# Patient Record
Sex: MALE | Race: WHITE | NOT HISPANIC OR LATINO | URBAN - METROPOLITAN AREA
[De-identification: names, ages, dates, MRNs, and addresses within clinical notes are randomized per-mention and may not be internally consistent; named-entity substitution may affect disease eponyms.]

---

## 2017-04-27 ENCOUNTER — OFFICE VISIT - RIVER FALLS (OUTPATIENT)
Dept: FAMILY MEDICINE | Facility: CLINIC | Age: 57
End: 2017-04-27

## 2017-04-27 ENCOUNTER — COMMUNICATION - RIVER FALLS (OUTPATIENT)
Dept: FAMILY MEDICINE | Facility: CLINIC | Age: 57
End: 2017-04-27

## 2017-04-28 LAB
CHOLEST SERPL-MCNC: 162 MG/DL (ref 125–200)
CHOLEST/HDLC SERPL: 4 {RATIO}
CREAT SERPL-MCNC: 0.77 MG/DL (ref 0.7–1.33)
GLUCOSE BLD-MCNC: 107 MG/DL (ref 65–99)
HBA1C MFR BLD: 6.2 %
HDLC SERPL-MCNC: 41 MG/DL
LDLC SERPL CALC-MCNC: 105 MG/DL
NONHDLC SERPL-MCNC: 121 MG/DL
TRIGL SERPL-MCNC: 80 MG/DL

## 2017-05-01 ENCOUNTER — OFFICE VISIT - RIVER FALLS (OUTPATIENT)
Dept: FAMILY MEDICINE | Facility: CLINIC | Age: 57
End: 2017-05-01

## 2017-05-01 ASSESSMENT — MIFFLIN-ST. JEOR: SCORE: 1901.24

## 2018-02-19 ENCOUNTER — AMBULATORY - RIVER FALLS (OUTPATIENT)
Dept: FAMILY MEDICINE | Facility: CLINIC | Age: 58
End: 2018-02-19

## 2018-02-20 LAB
CREAT SERPL-MCNC: 0.72 MG/DL (ref 0.7–1.33)
GLUCOSE BLD-MCNC: 114 MG/DL (ref 65–99)
HBA1C MFR BLD: 6 %
PSA SERPL-MCNC: 0.5 NG/ML

## 2018-02-28 ENCOUNTER — OFFICE VISIT - RIVER FALLS (OUTPATIENT)
Dept: FAMILY MEDICINE | Facility: CLINIC | Age: 58
End: 2018-02-28

## 2018-02-28 ASSESSMENT — MIFFLIN-ST. JEOR: SCORE: 1899.42

## 2018-08-23 ENCOUNTER — AMBULATORY - RIVER FALLS (OUTPATIENT)
Dept: FAMILY MEDICINE | Facility: CLINIC | Age: 58
End: 2018-08-23

## 2018-08-24 LAB
CHOLEST SERPL-MCNC: 173 MG/DL
CHOLEST/HDLC SERPL: 3.9 {RATIO}
CREAT SERPL-MCNC: 0.82 MG/DL (ref 0.7–1.33)
GLUCOSE BLD-MCNC: 101 MG/DL (ref 65–99)
HBA1C MFR BLD: 6.3 %
HDLC SERPL-MCNC: 44 MG/DL
LDLC SERPL CALC-MCNC: 110 MG/DL
NONHDLC SERPL-MCNC: 129 MG/DL
TRIGL SERPL-MCNC: 99 MG/DL

## 2018-08-29 ENCOUNTER — OFFICE VISIT - RIVER FALLS (OUTPATIENT)
Dept: FAMILY MEDICINE | Facility: CLINIC | Age: 58
End: 2018-08-29

## 2018-08-29 ASSESSMENT — MIFFLIN-ST. JEOR: SCORE: 1897.61

## 2019-03-04 ENCOUNTER — AMBULATORY - RIVER FALLS (OUTPATIENT)
Dept: FAMILY MEDICINE | Facility: CLINIC | Age: 59
End: 2019-03-04

## 2019-03-05 LAB
A/G RATIO - HISTORICAL: 1.6 (ref 1–2.5)
ALBUMIN SERPL-MCNC: 4.4 GM/DL (ref 3.6–5.1)
ALP SERPL-CCNC: 76 UNIT/L (ref 40–115)
ALT SERPL W P-5'-P-CCNC: 38 UNIT/L (ref 9–46)
AST SERPL W P-5'-P-CCNC: 40 UNIT/L (ref 10–35)
BILIRUB SERPL-MCNC: 0.8 MG/DL (ref 0.2–1.2)
BUN SERPL-MCNC: 16 MG/DL (ref 7–25)
BUN/CREAT RATIO - HISTORICAL: ABNORMAL (ref 6–22)
CALCIUM SERPL-MCNC: 9.4 MG/DL (ref 8.6–10.3)
CHLORIDE BLD-SCNC: 103 MMOL/L (ref 98–110)
CHOLEST SERPL-MCNC: 179 MG/DL
CHOLEST/HDLC SERPL: 3.9 {RATIO}
CO2 SERPL-SCNC: 26 MMOL/L (ref 20–32)
CREAT SERPL-MCNC: 0.77 MG/DL (ref 0.7–1.33)
CREAT UR-MCNC: 20 MG/DL (ref 20–320)
EGFRCR SERPLBLD CKD-EPI 2021: 99 ML/MIN/1.73M2
ERYTHROCYTE [DISTWIDTH] IN BLOOD BY AUTOMATED COUNT: 12.8 % (ref 11–15)
GLOBULIN: 2.7 (ref 1.9–3.7)
GLUCOSE BLD-MCNC: 94 MG/DL (ref 65–99)
HBA1C MFR BLD: 6.4 %
HCT VFR BLD AUTO: 43.1 % (ref 38.5–50)
HDLC SERPL-MCNC: 46 MG/DL
HGB BLD-MCNC: 14.4 GM/DL (ref 13.2–17.1)
LDLC SERPL CALC-MCNC: 116 MG/DL
MCH RBC QN AUTO: 29.6 PG (ref 27–33)
MCHC RBC AUTO-ENTMCNC: 33.4 GM/DL (ref 32–36)
MCV RBC AUTO: 88.5 FL (ref 80–100)
MICROALBUMIN UR-MCNC: <0.2 MG/DL
MICROALBUMIN/CREAT UR: NORMAL MG/G{CREAT}
NONHDLC SERPL-MCNC: 133 MG/DL
PLATELET # BLD AUTO: 221 10*3/UL (ref 140–400)
PMV BLD: 9.6 FL (ref 7.5–12.5)
POTASSIUM BLD-SCNC: 3.9 MMOL/L (ref 3.5–5.3)
PROT SERPL-MCNC: 7.1 GM/DL (ref 6.1–8.1)
PSA SERPL-MCNC: 0.6 NG/ML
RBC # BLD AUTO: 4.87 10*6/UL (ref 4.2–5.8)
SODIUM SERPL-SCNC: 138 MMOL/L (ref 135–146)
TRIGL SERPL-MCNC: 74 MG/DL
WBC # BLD AUTO: 7 10*3/UL (ref 3.8–10.8)

## 2019-03-11 ENCOUNTER — OFFICE VISIT - RIVER FALLS (OUTPATIENT)
Dept: FAMILY MEDICINE | Facility: CLINIC | Age: 59
End: 2019-03-11

## 2019-03-11 ASSESSMENT — MIFFLIN-ST. JEOR: SCORE: 1860.41

## 2019-09-05 ENCOUNTER — AMBULATORY - RIVER FALLS (OUTPATIENT)
Dept: FAMILY MEDICINE | Facility: CLINIC | Age: 59
End: 2019-09-05

## 2019-09-06 LAB
A/G RATIO - HISTORICAL: 1.4 (ref 1–2.5)
ALBUMIN SERPL-MCNC: 4.3 GM/DL (ref 3.6–5.1)
ALP SERPL-CCNC: 76 UNIT/L (ref 40–115)
ALT SERPL W P-5'-P-CCNC: 44 UNIT/L (ref 9–46)
AST SERPL W P-5'-P-CCNC: 55 UNIT/L (ref 10–35)
BILIRUB SERPL-MCNC: 1.1 MG/DL (ref 0.2–1.2)
BUN SERPL-MCNC: 19 MG/DL (ref 7–25)
BUN/CREAT RATIO - HISTORICAL: ABNORMAL (ref 6–22)
CALCIUM SERPL-MCNC: 9.3 MG/DL (ref 8.6–10.3)
CHLORIDE BLD-SCNC: 102 MMOL/L (ref 98–110)
CHOLEST SERPL-MCNC: 175 MG/DL
CHOLEST/HDLC SERPL: 3.9 {RATIO}
CO2 SERPL-SCNC: 27 MMOL/L (ref 20–32)
CREAT SERPL-MCNC: 0.95 MG/DL (ref 0.7–1.25)
EGFRCR SERPLBLD CKD-EPI 2021: 87 ML/MIN/1.73M2
ERYTHROCYTE [DISTWIDTH] IN BLOOD BY AUTOMATED COUNT: 12.2 % (ref 11–15)
GLOBULIN: 3.1 (ref 1.9–3.7)
GLUCOSE BLD-MCNC: 108 MG/DL (ref 65–99)
HBA1C MFR BLD: 6.3 %
HCT VFR BLD AUTO: 46 % (ref 38.5–50)
HDLC SERPL-MCNC: 45 MG/DL
HGB BLD-MCNC: 15.7 GM/DL (ref 13.2–17.1)
LDLC SERPL CALC-MCNC: 107 MG/DL
MCH RBC QN AUTO: 30.9 PG (ref 27–33)
MCHC RBC AUTO-ENTMCNC: 34.1 GM/DL (ref 32–36)
MCV RBC AUTO: 90.6 FL (ref 80–100)
NONHDLC SERPL-MCNC: 130 MG/DL
PLATELET # BLD AUTO: 207 10*3/UL (ref 140–400)
PMV BLD: 9.3 FL (ref 7.5–12.5)
POTASSIUM BLD-SCNC: 4 MMOL/L (ref 3.5–5.3)
PROT SERPL-MCNC: 7.4 GM/DL (ref 6.1–8.1)
RBC # BLD AUTO: 5.08 10*6/UL (ref 4.2–5.8)
SODIUM SERPL-SCNC: 138 MMOL/L (ref 135–146)
TRIGL SERPL-MCNC: 115 MG/DL
WBC # BLD AUTO: 9.1 10*3/UL (ref 3.8–10.8)

## 2019-09-09 ENCOUNTER — COMMUNICATION - RIVER FALLS (OUTPATIENT)
Dept: FAMILY MEDICINE | Facility: CLINIC | Age: 59
End: 2019-09-09

## 2019-09-12 ENCOUNTER — OFFICE VISIT - RIVER FALLS (OUTPATIENT)
Dept: FAMILY MEDICINE | Facility: CLINIC | Age: 59
End: 2019-09-12

## 2019-09-12 ASSESSMENT — MIFFLIN-ST. JEOR: SCORE: 1858.6

## 2020-03-09 ENCOUNTER — AMBULATORY - RIVER FALLS (OUTPATIENT)
Dept: FAMILY MEDICINE | Facility: CLINIC | Age: 60
End: 2020-03-09

## 2020-03-10 ENCOUNTER — COMMUNICATION - RIVER FALLS (OUTPATIENT)
Dept: FAMILY MEDICINE | Facility: CLINIC | Age: 60
End: 2020-03-10

## 2020-03-10 LAB
A/G RATIO - HISTORICAL: 1.4 (ref 1–2.5)
ALBUMIN SERPL-MCNC: 4.2 GM/DL (ref 3.6–5.1)
ALP SERPL-CCNC: 85 UNIT/L (ref 35–144)
ALT SERPL W P-5'-P-CCNC: 34 UNIT/L (ref 9–46)
AST SERPL W P-5'-P-CCNC: 29 UNIT/L (ref 10–35)
BILIRUB SERPL-MCNC: 0.7 MG/DL (ref 0.2–1.2)
BUN SERPL-MCNC: 16 MG/DL (ref 7–25)
BUN/CREAT RATIO - HISTORICAL: ABNORMAL (ref 6–22)
CALCIUM SERPL-MCNC: 9.5 MG/DL (ref 8.6–10.3)
CHLORIDE BLD-SCNC: 103 MMOL/L (ref 98–110)
CHOLEST SERPL-MCNC: 146 MG/DL
CHOLEST/HDLC SERPL: 3.7 {RATIO}
CO2 SERPL-SCNC: 27 MMOL/L (ref 20–32)
CREAT SERPL-MCNC: 0.72 MG/DL (ref 0.7–1.25)
EGFRCR SERPLBLD CKD-EPI 2021: 101 ML/MIN/1.73M2
ERYTHROCYTE [DISTWIDTH] IN BLOOD BY AUTOMATED COUNT: 12.8 % (ref 11–15)
GLOBULIN: 2.9 (ref 1.9–3.7)
GLUCOSE BLD-MCNC: 102 MG/DL (ref 65–99)
HBA1C MFR BLD: 6.2 %
HCT VFR BLD AUTO: 43.4 % (ref 38.5–50)
HDLC SERPL-MCNC: 39 MG/DL
HGB BLD-MCNC: 15.3 GM/DL (ref 13.2–17.1)
LDLC SERPL CALC-MCNC: 87 MG/DL
MCH RBC QN AUTO: 30.7 PG (ref 27–33)
MCHC RBC AUTO-ENTMCNC: 35.3 GM/DL (ref 32–36)
MCV RBC AUTO: 87.1 FL (ref 80–100)
MICROALBUMIN UR-MCNC: 0.2 MG/DL
NONHDLC SERPL-MCNC: 107 MG/DL
PLATELET # BLD AUTO: 214 10*3/UL (ref 140–400)
PMV BLD: 10.1 FL (ref 7.5–12.5)
POTASSIUM BLD-SCNC: 4.5 MMOL/L (ref 3.5–5.3)
PROT SERPL-MCNC: 7.1 GM/DL (ref 6.1–8.1)
PSA SERPL-MCNC: 0.9 NG/ML
RBC # BLD AUTO: 4.98 10*6/UL (ref 4.2–5.8)
SODIUM SERPL-SCNC: 138 MMOL/L (ref 135–146)
TRIGL SERPL-MCNC: 103 MG/DL
WBC # BLD AUTO: 8.7 10*3/UL (ref 3.8–10.8)

## 2020-09-09 ENCOUNTER — AMBULATORY - RIVER FALLS (OUTPATIENT)
Dept: FAMILY MEDICINE | Facility: CLINIC | Age: 60
End: 2020-09-09

## 2020-09-10 LAB
A/G RATIO - HISTORICAL: 1.4 (ref 1–2.5)
ALBUMIN SERPL-MCNC: 4.3 GM/DL (ref 3.6–5.1)
ALP SERPL-CCNC: 90 UNIT/L (ref 35–144)
ALT SERPL W P-5'-P-CCNC: 34 UNIT/L (ref 9–46)
AST SERPL W P-5'-P-CCNC: 36 UNIT/L (ref 10–35)
BILIRUB SERPL-MCNC: 0.9 MG/DL (ref 0.2–1.2)
BUN SERPL-MCNC: 16 MG/DL (ref 7–25)
BUN/CREAT RATIO - HISTORICAL: ABNORMAL (ref 6–22)
CALCIUM SERPL-MCNC: 9.5 MG/DL (ref 8.6–10.3)
CHLORIDE BLD-SCNC: 104 MMOL/L (ref 98–110)
CHOLEST SERPL-MCNC: 158 MG/DL
CHOLEST/HDLC SERPL: 3.4 {RATIO}
CO2 SERPL-SCNC: 28 MMOL/L (ref 20–32)
CREAT SERPL-MCNC: 0.9 MG/DL (ref 0.7–1.25)
EGFRCR SERPLBLD CKD-EPI 2021: 92 ML/MIN/1.73M2
ERYTHROCYTE [DISTWIDTH] IN BLOOD BY AUTOMATED COUNT: 12.8 % (ref 11–15)
GLOBULIN: 3.1 (ref 1.9–3.7)
GLUCOSE BLD-MCNC: 114 MG/DL (ref 65–99)
HBA1C MFR BLD: 6.4 %
HCT VFR BLD AUTO: 44.3 % (ref 38.5–50)
HDLC SERPL-MCNC: 46 MG/DL
HGB BLD-MCNC: 15.9 GM/DL (ref 13.2–17.1)
LDLC SERPL CALC-MCNC: 94 MG/DL
MCH RBC QN AUTO: 31.9 PG (ref 27–33)
MCHC RBC AUTO-ENTMCNC: 35.9 GM/DL (ref 32–36)
MCV RBC AUTO: 88.8 FL (ref 80–100)
NONHDLC SERPL-MCNC: 112 MG/DL
PLATELET # BLD AUTO: 209 10*3/UL (ref 140–400)
PMV BLD: 10.1 FL (ref 7.5–12.5)
POTASSIUM BLD-SCNC: 4.5 MMOL/L (ref 3.5–5.3)
PROT SERPL-MCNC: 7.4 GM/DL (ref 6.1–8.1)
RBC # BLD AUTO: 4.99 10*6/UL (ref 4.2–5.8)
SODIUM SERPL-SCNC: 139 MMOL/L (ref 135–146)
TRIGL SERPL-MCNC: 85 MG/DL
WBC # BLD AUTO: 8.4 10*3/UL (ref 3.8–10.8)

## 2020-09-14 ENCOUNTER — COMMUNICATION - RIVER FALLS (OUTPATIENT)
Dept: FAMILY MEDICINE | Facility: CLINIC | Age: 60
End: 2020-09-14

## 2020-09-15 ENCOUNTER — OFFICE VISIT - RIVER FALLS (OUTPATIENT)
Dept: FAMILY MEDICINE | Facility: CLINIC | Age: 60
End: 2020-09-15

## 2021-03-02 ENCOUNTER — OFFICE VISIT - RIVER FALLS (OUTPATIENT)
Dept: FAMILY MEDICINE | Facility: CLINIC | Age: 61
End: 2021-03-02

## 2021-03-03 ENCOUNTER — COMMUNICATION - RIVER FALLS (OUTPATIENT)
Dept: FAMILY MEDICINE | Facility: CLINIC | Age: 61
End: 2021-03-03

## 2021-03-03 LAB
A/G RATIO - HISTORICAL: 1.6 (ref 1–2.5)
ALBUMIN SERPL-MCNC: 4.4 GM/DL (ref 3.6–5.1)
ALP SERPL-CCNC: 95 UNIT/L (ref 35–144)
ALT SERPL W P-5'-P-CCNC: 30 UNIT/L (ref 9–46)
AST SERPL W P-5'-P-CCNC: 34 UNIT/L (ref 10–35)
BILIRUB SERPL-MCNC: 1.2 MG/DL (ref 0.2–1.2)
BUN SERPL-MCNC: 13 MG/DL (ref 7–25)
BUN/CREAT RATIO - HISTORICAL: NORMAL (ref 6–22)
CALCIUM SERPL-MCNC: 9.4 MG/DL (ref 8.6–10.3)
CHLORIDE BLD-SCNC: 103 MMOL/L (ref 98–110)
CO2 SERPL-SCNC: 27 MMOL/L (ref 20–32)
CREAT SERPL-MCNC: 0.82 MG/DL (ref 0.7–1.25)
CREAT UR-MCNC: 25 MG/DL (ref 20–320)
EGFRCR SERPLBLD CKD-EPI 2021: 95 ML/MIN/1.73M2
ERYTHROCYTE [DISTWIDTH] IN BLOOD BY AUTOMATED COUNT: 12.4 % (ref 11–15)
GLOBULIN: 2.7 (ref 1.9–3.7)
GLUCOSE BLD-MCNC: 95 MG/DL (ref 65–99)
HBA1C MFR BLD: 6.2 %
HCT VFR BLD AUTO: 44.4 % (ref 38.5–50)
HGB BLD-MCNC: 15.1 GM/DL (ref 13.2–17.1)
MCH RBC QN AUTO: 30.5 PG (ref 27–33)
MCHC RBC AUTO-ENTMCNC: 34 GM/DL (ref 32–36)
MCV RBC AUTO: 89.7 FL (ref 80–100)
MICROALBUMIN UR-MCNC: <0.2 MG/DL
MICROALBUMIN/CREAT UR: NORMAL MG/G{CREAT}
PLATELET # BLD AUTO: 204 10*3/UL (ref 140–400)
PMV BLD: 10.3 FL (ref 7.5–12.5)
POTASSIUM BLD-SCNC: 4.3 MMOL/L (ref 3.5–5.3)
PROT SERPL-MCNC: 7.1 GM/DL (ref 6.1–8.1)
PSA SERPL-MCNC: 0.6 NG/ML
RBC # BLD AUTO: 4.95 10*6/UL (ref 4.2–5.8)
SODIUM SERPL-SCNC: 138 MMOL/L (ref 135–146)
WBC # BLD AUTO: 8 10*3/UL (ref 3.8–10.8)

## 2021-03-08 ENCOUNTER — OFFICE VISIT - RIVER FALLS (OUTPATIENT)
Dept: FAMILY MEDICINE | Facility: CLINIC | Age: 61
End: 2021-03-08

## 2021-03-08 ASSESSMENT — MIFFLIN-ST. JEOR: SCORE: 1799.63

## 2021-08-02 ENCOUNTER — AMBULATORY - RIVER FALLS (OUTPATIENT)
Dept: FAMILY MEDICINE | Facility: CLINIC | Age: 61
End: 2021-08-02

## 2021-08-03 ENCOUNTER — COMMUNICATION - RIVER FALLS (OUTPATIENT)
Dept: FAMILY MEDICINE | Facility: CLINIC | Age: 61
End: 2021-08-03

## 2021-08-03 LAB — HBA1C MFR BLD: 6.2 %

## 2021-08-09 ENCOUNTER — OFFICE VISIT - RIVER FALLS (OUTPATIENT)
Dept: FAMILY MEDICINE | Facility: CLINIC | Age: 61
End: 2021-08-09

## 2021-08-09 ASSESSMENT — MIFFLIN-ST. JEOR: SCORE: 1780.12

## 2022-02-11 VITALS
WEIGHT: 247.6 LBS | DIASTOLIC BLOOD PRESSURE: 82 MMHG | BODY MASS INDEX: 37.53 KG/M2 | OXYGEN SATURATION: 98 % | HEIGHT: 68 IN | HEART RATE: 61 BPM | SYSTOLIC BLOOD PRESSURE: 124 MMHG

## 2022-02-11 VITALS
HEIGHT: 68 IN | DIASTOLIC BLOOD PRESSURE: 60 MMHG | WEIGHT: 226 LBS | SYSTOLIC BLOOD PRESSURE: 118 MMHG | HEART RATE: 62 BPM | BODY MASS INDEX: 34.25 KG/M2

## 2022-02-11 VITALS
TEMPERATURE: 98.1 F | HEIGHT: 68 IN | HEART RATE: 72 BPM | OXYGEN SATURATION: 98 % | SYSTOLIC BLOOD PRESSURE: 132 MMHG | DIASTOLIC BLOOD PRESSURE: 80 MMHG | WEIGHT: 221.7 LBS | BODY MASS INDEX: 33.6 KG/M2

## 2022-02-11 VITALS
HEIGHT: 68 IN | DIASTOLIC BLOOD PRESSURE: 70 MMHG | HEART RATE: 60 BPM | WEIGHT: 239.4 LBS | SYSTOLIC BLOOD PRESSURE: 122 MMHG | BODY MASS INDEX: 36.28 KG/M2 | OXYGEN SATURATION: 97 %

## 2022-02-11 VITALS
WEIGHT: 248.4 LBS | DIASTOLIC BLOOD PRESSURE: 82 MMHG | HEART RATE: 64 BPM | SYSTOLIC BLOOD PRESSURE: 130 MMHG | BODY MASS INDEX: 37.65 KG/M2 | HEIGHT: 68 IN

## 2022-02-11 VITALS
HEIGHT: 68 IN | SYSTOLIC BLOOD PRESSURE: 122 MMHG | WEIGHT: 248 LBS | HEART RATE: 60 BPM | BODY MASS INDEX: 37.59 KG/M2 | DIASTOLIC BLOOD PRESSURE: 68 MMHG | OXYGEN SATURATION: 97 %

## 2022-02-11 VITALS
HEART RATE: 60 BPM | OXYGEN SATURATION: 97 % | SYSTOLIC BLOOD PRESSURE: 128 MMHG | HEIGHT: 68 IN | WEIGHT: 239 LBS | DIASTOLIC BLOOD PRESSURE: 76 MMHG | BODY MASS INDEX: 36.22 KG/M2

## 2022-02-15 NOTE — PROGRESS NOTES
Patient:   NATE AMARAL            MRN: [id number]            FIN: [id number]               Age:   57 years     Sex:  Male     :  1960   Associated Diagnoses:   HTN (Hypertension); Diabetes mellitus type 2   Author:   Quinton France MD      Impression and Plan   Diagnosis     HTN (Hypertension) ([id number] I10).     Course:  Well controlled.    Orders     Orders   Charges (Evaluation and Management):  14020 office outpatient visit 15 minutes (Charge) (Order): Quantity: 1, HTN (Hypertension)  Hyperlipidemia  Diabetes mellitus type 2.     Orders (Selected)   Prescriptions  Prescribed  atenolol 50 mg oral tablet: 1 tab(s) ( 50 mg ), po, daily, # 90 tab(s), 1 Refill(s), Type: Maintenance, Pharmacy: [facility], Pt due for appt, 1 tab(s) po daily.     Diagnosis     Diabetes mellitus type 2 (AAJ41-PX E11.9).     Course:  Well controlled.    Orders     Orders (Selected)   Prescriptions  Prescribed  metFORMIN 500 mg oral tablet: 2 tab(s) ( 1,000 mg ), po, bid, # 360 tab(s), 1 Refill(s), Type: Maintenance, Pharmacy: [facility], Pt due for appt, 2 tab(s) po bid.        Visit Information      Date of Service: 2018 02:21 pm  Performing Location: [facility]  Encounter#: [id number]      Primary Care Provider (PCP):  Quinton France MD    NPI# [id number]      Referring Provider:  Quinton France MD, NPI# [id number]   Visit type:  Scheduled follow-up.    Accompanied by:  No one.    Source of history:  Self.    Referral source:  Self.    History limitation:  None.       Chief Complaint   2018 2:25 PM CST    Pt here for med ck        History of Present Illness             The patient presents for follow-up evaluation of diabetes.  The quality of the patient's diabetes is described as being unchanged from previous visit.  Relieving factors consist of diet changes, increased activity and medication.  Associated symptoms consist of none.  Medical encounters: none.  Compliance problems:  none.  Glucose results: within target range.  Hemoglobin A1c results: > 6% and within target range.  Lifestyle modification: weight reduction, diet, increased physical activity.  Medications: see medication list .  Additional pertinent history: last eye exam: 6/15/2017, last podiatric foot exam: 2/28/2018 and eye exam recommended.  No reported episodes of hypoglycemia.               The patient presents for follow-up evaluation of hypertension.  The quality of hypertension symptom(s) since the patient's last visit is described as being unchanged.  The severity of the hypertension symptom(s) since the last visit is moderate.  Since the patient's last visit, the timing/course of hypertension symptom(s) is constant.  Exacerbating factors consist of none.  Relieving factors consist of medication.  Associated symptoms consist of none.  Prior treatment consists of lifestyle modification (weight reduction, dietary sodium restriction, increased physical activity, adoption of DASH eating plan).  Medical encounters: none.  Compliance problems: none.        Review of Systems   Constitutional:  Negative.    Eye:  Negative.    Ear/Nose/Mouth/Throat:  Negative.    Respiratory:  Negative.    Cardiovascular:  Negative.    Gastrointestinal:  Negative.    Genitourinary:  Negative.    Hematology/Lymphatics:  Negative.    Endocrine:  Negative.    Immunologic:  Negative.    Musculoskeletal:  Negative.    Integumentary:  Negative.    Neurologic:  Negative.    Psychiatric:  Negative.    All other systems reviewed and negative      Health Status   Allergies:    Allergic Reactions (Selected)  Severity Not Documented  Simvastatin (Myalgia)   Medications:  (Selected)   Prescriptions  Prescribed  aspirin 81 mg oral enteric coated tablet: 1 tab(s) ( 81 mg ), PO, Daily, # 30 tab(s), 0 Refill(s), Type: Maintenance, OTC (Rx)  atenolol 50 mg oral tablet: 1 tab(s) ( 50 mg ), po, daily, # 90 tab(s), 1 Refill(s), Type: Maintenance, Pharmacy: [facility], Pt due for appt, 1 tab(s) po daily  metFORMIN 500 mg oral tablet: 2 tab(s) ( 1,000 mg ), po, bid, # 360 tab(s), 1 Refill(s), Type: Maintenance, Pharmacy: [facility], Pt due for appt, 2 tab(s) po bid   Problem list:    All Problems  Diabetes / SNOMED CT 727879951 / Confirmed  Diabetes mellitus type 2 / SNOMED CT 939235561 / Confirmed  Fatty Liver / ICD-9-.8 / Confirmed  HTN (Hypertension) / ICD-9-.9 / Confirmed  Hyperlipidemia / SNOMED CT 76005523 / Confirmed  Obesity / ICD-9-.00 / Confirmed  FRANCISCO J (Obstructive Sleep Apnea) / ICD-9-.23 / Confirmed  Canceled: Hyperlipemia / ICD-9-.4      Histories   Past Medical History:    Active  HTN (Hypertension) (401.9)  Obesity (278.00)  Fatty Liver (571.8)  Diabetes mellitus type 2 (182426654)  FRANCISCO J (Obstructive Sleep Apnea) (327.23)   Family History:    Cancer  Father ()  Diabetes mellitus type II  Mother     Procedure history:    Colonoscopy (SNOMED CT 704547987) in  at 48 Years.  Rotator cuff repair (SNOMED CT 110547959) in  at 42 Years.  Comments:  2010 2:51 PM - Quinton France MD  Right shoulder  Arthroscopy (SNOMED CT 38600883) in  at 26 Years.  Comments:  6/3/2010 2:52 PM - Darcy Berrios LPN  left knee  Concussion (SNOMED CT 014855514) in  at 13 Years.   Social History:        Alcohol Assessment: Current            Current      Tobacco Assessment: Denies Tobacco Use            Never      Exercise and Physical Activity Assessment: Occasional exercise        Physical Examination   Vital Signs   2018 2:25 PM CST Peripheral Pulse Rate 60 bpm    Systolic Blood Pressure 122 mmHg    Diastolic Blood Pressure 68 mmHg    Mean Arterial Pressure 86 mmHg    BP Site Right arm    Oxygen Saturation 97 %      Measurements from flowsheet : Measurements   2018 2:25 PM CST Height Measured - Standard 68 in    Weight Measured - Standard 248 lb    BSA 2.32 m2    Body Mass Index 37.7 kg/m2  HI      General:   Alert and oriented, No acute distress.    HENT:  Normocephalic.    Neck:  Supple, No lymphadenopathy, No thyromegaly.    Respiratory:  Lungs are clear to auscultation, Respirations are non-labored, Breath sounds are equal, Symmetrical chest wall expansion.    Cardiovascular:  Normal rate, Regular rhythm, No murmur, No gallop, Good pulses equal in all extremities, Normal peripheral perfusion, No edema.    Gastrointestinal:  Soft, Non-tender, Non-distended, Normal bowel sounds, No organomegaly.    Musculoskeletal:  Normal range of motion, No swelling, No deformity, Normal gait.    Integumentary:  Warm, Dry, Pink, No foot ulcers or skin lesions..    Feet:  Normal by visual exam, Sensation intact, By monofilament exam.    Neurologic:  Alert, Oriented, Normal sensory, Normal motor function, No focal deficits.    Psychiatric:  Cooperative, Appropriate mood & affect.       Review / Management   Results review:  Lab results   2/19/2018 12:06 PM CST Sodium Level 139 mmol/L    Potassium Level 4.0 mmol/L    Chloride Level 102 mmol/L    CO2 Level 27 mmol/L    Glucose Level 114 mg/dL  HI    BUN 19 mg/dL    Creatinine Level 0.72 mg/dL    BUN/Creat Ratio NOT APPLICABLE    eGFR 103 mL/min/1.73m2    eGFR African American 119 mL/min/1.73m2    Calcium Level 9.4 mg/dL    Bilirubin Total 0.9 mg/dL    Alkaline Phosphatase 81 unit/L    AST/SGOT 30 unit/L    ALT/SGPT 32 unit/L    Protein Total 7.4 gm/dL    Albumin Level 4.5 gm/dL    Globulin 2.9    A/G Ratio 1.6    Hgb A1c 6.0  HI    PSA 0.5 ng/mL    WBC 8.7    RBC 5.08    Hgb 15.1 gm/dL    Hct 44.4 %    MCV 87.4 fL    MCH 29.7 pg    MCHC 34.0 gm/dL    RDW 12.5 %    Platelet 214    MPV 9.6 fL   .

## 2022-02-15 NOTE — CARE COORDINATION
Pt appears on [facility] chronic disease panel as out of parameters for LDL  Pt has RTC 9/2018 chronic disease visit with fast labs

## 2022-02-15 NOTE — TELEPHONE ENCOUNTER
---------------------  From: Quinton France MD   To: NATE AMARAL    Sent: 9/14/2020 8:55:44 AM CDT  Subject: General Message       All results are within acceptable limits. No treatment changes are recommended at this time.      Results:  Date Result Name Ind Value Ref Range   9/9/2020 2:00 PM Sodium Level  139 mmol/L (135 - 146)   9/9/2020 2:00 PM Potassium Level  4.5 mmol/L (3.5 - 5.3)   9/9/2020 2:00 PM Chloride Level  104 mmol/L (98 - 110)   9/9/2020 2:00 PM CO2 Level  28 mmol/L (20 - 32)   9/9/2020 2:00 PM Glucose Level ((H)) 114 mg/dL (65 - 99)   9/9/2020 2:00 PM BUN  16 mg/dL (7 - 25)   9/9/2020 2:00 PM Creatinine Level  0.90 mg/dL (0.70 - 1.25)   9/9/2020 2:00 PM BUN/Creat Ratio  NOT APPLICABLE (6 - 22)   9/9/2020 2:00 PM eGFR  92 mL/min/1.73m2 (> OR = 60 - )   9/9/2020 2:00 PM eGFR African American  106 mL/min/1.73m2 (> OR = 60 - )   9/9/2020 2:00 PM Calcium Level  9.5 mg/dL (8.6 - 10.3)   9/9/2020 2:00 PM Bilirubin Total  0.9 mg/dL (0.2 - 1.2)   9/9/2020 2:00 PM Alkaline Phosphatase  90 unit/L (35 - 144)   9/9/2020 2:00 PM AST/SGOT ((H)) 36 unit/L (10 - 35)   9/9/2020 2:00 PM ALT/SGPT  34 unit/L (9 - 46)   9/9/2020 2:00 PM Protein Total  7.4 gm/dL (6.1 - 8.1)   9/9/2020 2:00 PM Albumin Level  4.3 gm/dL (3.6 - 5.1)   9/9/2020 2:00 PM Globulin  3.1 (1.9 - 3.7)   9/9/2020 2:00 PM A/G Ratio  1.4 (1.0 - 2.5)   9/9/2020 2:00 PM Hgb A1c ((H)) 6.4 ( - <5.7)   9/9/2020 2:00 PM Cholesterol  158 mg/dL ( - <200)   9/9/2020 2:00 PM Non-HDL Cholesterol  112 ( - <130)   9/9/2020 2:00 PM HDL  46 mg/dL (> OR = 40 - )   9/9/2020 2:00 PM Cholesterol/HDL Ratio  3.4 ( - <5.0)   9/9/2020 2:00 PM LDL  94    9/9/2020 2:00 PM Triglyceride  85 mg/dL ( - <150)   9/9/2020 2:00 PM WBC  8.4 (3.8 - 10.8)   9/9/2020 2:00 PM RBC  4.99 (4.20 - 5.80)   9/9/2020 2:00 PM Hgb  15.9 gm/dL (13.2 - 17.1)   9/9/2020 2:00 PM Hct  44.3 % (38.5 - 50.0)   9/9/2020 2:00 PM MCV  88.8 fL (80.0 - 100.0)   9/9/2020 2:00 PM MCH  31.9 pg (27.0 - 33.0)    9/9/2020 2:00 PM MCHC  35.9 gm/dL (32.0 - 36.0)   9/9/2020 2:00 PM RDW  12.8 % (11.0 - 15.0)   9/9/2020 2:00 PM Platelet  209 (140 - 400)   9/9/2020 2:00 PM MPV  10.1 fL (7.5 - 12.5)

## 2022-02-15 NOTE — NURSING NOTE
Comprehensive Intake Entered On:  9/15/2020 2:05 PM CDT    Performed On:  9/15/2020 2:04 PM CDT by Britany Betancourt CMA   Chief Complaint :   consent for telephone visit for med ck and refills   Serafin Britany KAPLAN - 9/15/2020 2:04 PM CDT   Health Status   Allergies Verified? :   Yes   Medication History Verified? :   Yes   Immunizations Current :   Yes   Medical History Verified? :   Yes   Tobacco Use? :   Never smoker   Serafin Britany KAPLAN - 9/15/2020 2:04 PM CDT   Consents   Consent for Immunization Exchange :   Consent Granted   Consent for Immunizations to Providers :   Consent Granted   BetancourtBritany boggs CMA - 9/15/2020 2:04 PM CDT   Meds / Allergies   (As Of: 9/15/2020 2:05:46 PM CDT)   Allergies (Active)   simvastatin  Estimated Onset Date:   Unspecified ; Reactions:   Myalgia ; Created By:   Marcelle Davenport MD; Reaction Status:   Active ; Category:   Drug ; Substance:   simvastatin ; Type:   Allergy ; Updated By:   Marcelle Davenport MD; Reviewed Date:   9/12/2019 2:25 PM CDT      statins  Estimated Onset Date:   Unspecified ; Created By:   Quinton France MD; Reaction Status:   Active ; Category:   Drug ; Substance:   statins ; Type:   Allergy ; Updated By:   Quinton France MD; Reviewed Date:   9/12/2019 2:25 PM CDT        Medication List   (As Of: 9/15/2020 2:05:46 PM CDT)   Prescription/Discharge Order    aspirin  :   aspirin ; Status:   Prescribed ; Ordered As Mnemonic:   aspirin 81 mg oral enteric coated tablet ; Simple Display Line:   81 mg, 1 tab(s), PO, Daily, 30 tab(s) ; Ordering Provider:   Quinton France MD; Catalog Code:   aspirin ; Order Dt/Tm:   2/21/2012 4:14:44 PM CST          atenolol  :   atenolol ; Status:   Prescribed ; Ordered As Mnemonic:   atenolol 50 mg oral tablet ; Simple Display Line:   1 tab(s), Oral, daily, 30 tab(s), 0 Refill(s) ; Ordering Provider:   Quinton France MD; Catalog Code:   atenolol ; Order Dt/Tm:   9/9/2020 1:46:56 PM CDT          metFORMIN  :    metFORMIN ; Status:   Prescribed ; Ordered As Mnemonic:   metFORMIN 500 mg oral tablet ; Simple Display Line:   2 tab(s), Oral, bid, 120 tab(s), 0 Refill(s) ; Ordering Provider:   Quinton France MD; Catalog Code:   metFORMIN ; Order Dt/Tm:   9/9/2020 1:46:57 PM CDT          Miscellaneous Rx Supply  :   Miscellaneous Rx Supply ; Status:   Prescribed ; Ordered As Mnemonic:   cpap supplies ; Simple Display Line:   See Instructions, chamber, hoses and filters, cushions, nasal supllies, 1 box(es), 11 Refill(s) ; Ordering Provider:   Quinton France MD; Catalog Code:   Miscellaneous Rx Supply ; Order Dt/Tm:   5/1/2018 12:12:12 PM CDT ; Comment:   please supply pt with Cpap supplies x 1 year            ID Risk Screen   Recent Travel History :   No recent travel   Family Member Travel History :   No recent travel   Other Exposure to Infectious Disease :   Unknown   Britany Betancourt CMA - 9/15/2020 2:04 PM CDT

## 2022-02-15 NOTE — PROGRESS NOTES
Patient:   NATE AMARAL            MRN: [id number]            FIN: [id number]               Age:   60 years     Sex:  Male     :  1960   Associated Diagnoses:   HTN (Hypertension); Diabetes mellitus type 2   Author:   Quinton France MD      Impression and Plan   Diagnosis     HTN (Hypertension) (YIS51-IP I10).     Course:  Well controlled.    Orders     Orders   Charges (Evaluation and Management):  65254 office outpatient visit 25 minutes (Charge) (Order): Quantity: 1, HTN (Hypertension)  Diabetes mellitus type 2.     Orders (Selected)   Prescriptions  Prescribed  atenolol 50 mg oral tablet: = 1 tab(s), Oral, daily, # 90 tab(s), 1 Refill(s), Type: Maintenance, Pharmacy: [facility], 1 tab(s) Oral daily, 68, in, 19 14:25:00 CDT, Height Measured, 239, lb, 19 14:25:00 CDT, Weight Measured.     Diagnosis     Diabetes mellitus type 2 (WMR83-EJ E11.9).     Course:  Well controlled.    Orders     Orders (Selected)   Prescriptions  Prescribed  metFORMIN 500 mg oral tablet: = 2 tab(s), Oral, bid, # 360 tab(s), 1 Refill(s), Type: Maintenance, Pharmacy: [facility], 2 tab(s) Oral bid, 68, in, 19 14:25:00 CDT, Height Measured, 239, lb, 19 14:25:00 CDT, Weight Measured.        Visit Information      Date of Service: 09/15/2020 07:24 am  Performing Location: [facility]  Encounter#: [id number]      Primary Care Provider (PCP):  Quinton France MD    NPI# [id number]      Referring Provider:  Quinton France MD# [id number]   Visit type:  Scheduled follow-up.    Accompanied by:  No one.    Source of history:  Self.    Referral source:  Self.    History limitation:  None.       Chief Complaint   9/15/2020 2:04 PM CDT    consent for telephone visit for med ck and refills        History of Present Illness             The patient presents for follow-up evaluation of diabetes.  The quality of the patient's diabetes is described as being unchanged from previous visit.   Relieving factors consist of diet changes, increased activity and medication.  Associated symptoms consist of none.  Medical encounters: none.  Compliance problems: none.  Glucose results: within target range.  Hemoglobin A1c results: > 6% and within target range.  Lifestyle modification: weight reduction, diet, increased physical activity.  Medications: see medication list .  Additional pertinent history: last eye exam: 6/15/2020 and last podiatric foot exam: 9/12/2019.  No reported episodes of hypoglycemia.               The patient presents for follow-up evaluation of hypertension.  The quality of hypertension symptom(s) since the patient's last visit is described as being unchanged.  The severity of the hypertension symptom(s) since the last visit is moderate.  Since the patient's last visit, the timing/course of hypertension symptom(s) is constant.  Exacerbating factors consist of none.  Relieving factors consist of medication.  Associated symptoms consist of none.  Prior treatment consists of lifestyle modification (weight reduction, dietary sodium restriction, increased physical activity, adoption of DASH eating plan).  Medical encounters: none.  Compliance problems: none.        Review of Systems   Constitutional:  Negative.    Eye:  Negative.    Ear/Nose/Mouth/Throat:  Negative.    Respiratory:  Negative.    Cardiovascular:  Negative.    Gastrointestinal:  Negative.    Genitourinary:  Negative.    Hematology/Lymphatics:  Negative.    Endocrine:  Negative.    Immunologic:  Negative.    Musculoskeletal:  Negative.    Integumentary:  Negative.    Neurologic:  Negative.    Psychiatric:  Negative.    All other systems reviewed and negative      Health Status   Allergies:    Allergic Reactions (Selected)  Severity Not Documented  Simvastatin (Myalgia)  Statins (No reactions were documented)   Medications:  (Selected)   Prescriptions  Prescribed  aspirin 81 mg oral enteric coated tablet: 1 tab(s) ( 81 mg ), PO,  Daily, # 30 tab(s), 0 Refill(s), Type: Maintenance, OTC (Rx)  atenolol 50 mg oral tablet: = 1 tab(s), Oral, daily, # 90 tab(s), 1 Refill(s), Type: Maintenance, Pharmacy: [facility], 1 tab(s) Oral daily, 68, in, 19 14:25:00 CDT, Height Measured, 239, lb, 19 14:25:00 CDT, Weight Measured  cpap supplies: cpap supplies, See Instructions, Instructions: chamber, hoses and filters, cushions, nasal supllies, Supply, # 1 box(es), 11 Refill(s), Type: Maintenance  metFORMIN 500 mg oral tablet: = 2 tab(s), Oral, bid, # 360 tab(s), 1 Refill(s), Type: Maintenance, Pharmacy: [facility], 2 tab(s) Oral bid, 68, in, 19 14:25:00 CDT, Height Measured, 239, lb, 19 14:25:00 CDT, Weight Measured   Problem list:    All Problems  Diabetes / SNOMED CT 075540585 / Confirmed  Diabetes mellitus type 2 / SNOMED CT 066424984 / Confirmed  Fatty Liver / ICD-9-.8 / Confirmed  HTN (Hypertension) / ICD-9-.9 / Confirmed  Hyperlipidemia / SNOMED CT 16347253 / Confirmed  Myalgia due to statin / SNOMED CT 1295465501 / Confirmed  Obesity / ICD-9-.00 / Confirmed  FRANCISCO J (Obstructive Sleep Apnea) / ICD-9-.23 / Confirmed  Canceled: Hyperlipemia / ICD-9-.4      Histories   Past Medical History:    Active  HTN (Hypertension) (401.9)  Obesity (278.00)  Fatty Liver (571.8)  Diabetes mellitus type 2 ()  FRANCISCO J (Obstructive Sleep Apnea) (327.23)   Family History:    Cancer  Father ()  Diabetes mellitus type II  Mother     Procedure history:    Colonoscopy (SNOMED CT 434968654) in  at 48 Years.  Rotator cuff repair (SNOMED CT 490931726) in  at 42 Years.  Comments:  2010 2:51 PM CDT - Quinton France MD  Right shoulder  Arthroscopy (SNOMED CT 63065543) in  at 26 Years.  Comments:  6/3/2010 2:52 PM CDT - Darcy Berrios LPN  left knee  Concussion (SNOMED CT 347833168) in  at 13 Years.   Social History:        Alcohol Assessment: Current            Current      Tobacco  Assessment: Denies Tobacco Use            Never      Exercise and Physical Activity Assessment: Occasional exercise        Physical Examination   VS/Measurements   General:  Alert and oriented, No acute distress.    HENT:  Normocephalic.    Neck:  Supple, No lymphadenopathy, No thyromegaly.    Respiratory:  Lungs are clear to auscultation, Respirations are non-labored, Breath sounds are equal, Symmetrical chest wall expansion.    Cardiovascular:  Normal rate, Regular rhythm, No murmur, No gallop, Good pulses equal in all extremities, Normal peripheral perfusion, No edema.    Gastrointestinal:  Soft, Non-tender, Non-distended, Normal bowel sounds, No organomegaly.    Musculoskeletal:  Normal range of motion, No swelling, No deformity, Normal gait.    Integumentary:  Warm, Dry, Pink, No foot ulcers or skin lesions..    Feet:  Normal by visual exam, Sensation intact, By monofilament exam.    Neurologic:  Alert, Oriented, Normal sensory, Normal motor function, No focal deficits.    Psychiatric:  Cooperative, Appropriate mood & affect.       Review / Management   Results review:  Lab results   9/9/2020 2:00 PM CDT Sodium Level 139 mmol/L    Potassium Level 4.5 mmol/L    Chloride Level 104 mmol/L    CO2 Level 28 mmol/L    Glucose Level 114 mg/dL  HI    BUN 16 mg/dL    Creatinine Level 0.90 mg/dL    BUN/Creat Ratio NOT APPLICABLE    eGFR 92 mL/min/1.73m2    eGFR African American 106 mL/min/1.73m2    Calcium Level 9.5 mg/dL    Bilirubin Total 0.9 mg/dL    Alkaline Phosphatase 90 unit/L    AST/SGOT 36 unit/L  HI    ALT/SGPT 34 unit/L    Protein Total 7.4 gm/dL    Albumin Level 4.3 gm/dL    Globulin 3.1    A/G Ratio 1.4    Hgb A1c 6.4  HI    Cholesterol 158 mg/dL    Non-HDL Cholesterol 112    HDL 46 mg/dL    Cholesterol/HDL Ratio 3.4    LDL 94    Triglyceride 85 mg/dL    WBC 8.4    RBC 4.99    Hgb 15.9 gm/dL    Hct 44.3 %    MCV 88.8 fL    MCH 31.9 pg    MCHC 35.9 gm/dL    RDW 12.8 %    Platelet 209    MPV 10.1 fL   .

## 2022-02-15 NOTE — PROGRESS NOTES
Patient:   NATE AMARAL            MRN: [id number]            FIN: [id number]               Age:   58 years     Sex:  Male     :  1960   Associated Diagnoses:   HTN (Hypertension); Diabetes mellitus type 2; FRANCISCO J (Obstructive Sleep Apnea)   Author:   Quinton France MD      Impression and Plan   Diagnosis     HTN (Hypertension) ([id number] I10).     Course:  Well controlled.    Orders     Orders   Charges (Evaluation and Management):  24283 office outpatient visit 25 minutes (Charge) (Order): Quantity: 1, HTN (Hypertension)  Diabetes mellitus type 2  FRANCISCO J (Obstructive Sleep Apnea).     Orders (Selected)   Prescriptions  Prescribed  atenolol 50 mg oral tablet: = 1 tab(s) ( 50 mg ), Oral, daily, # 90 tab(s), 1 Refill(s), Type: Maintenance, Pharmacy: [facility], 1 tab(s) Oral daily.     Diagnosis     Diabetes mellitus type 2 (FFO08-CU E11.9).     Course:  Well controlled.    Orders     Orders (Selected)   Prescriptions  Prescribed  metFORMIN 500 mg oral tablet: = 2 tab(s) ( 1,000 mg ), po, bid, # 360 tab(s), 1 Refill(s), Type: Maintenance, Pharmacy: [facility], Pt due for appt, 2 tab(s) Oral bid.     Diagnosis     FRANCISCO J (Obstructive Sleep Apnea) ([id number]-RG G47.33).     Course:  Progressing as expected.    Orders     Orders (Selected)   Prescriptions  Prescribed  cpap supplies: cpap supplies, See Instructions, Instructions: chamber, hoses and filters, cushions, nasal supllies, Supply, # 1 box(es), 11 Refill(s), Type: Maintenance.        Visit Information      Date of Service: 2019 02:24 pm  Performing Location: [facility]  Encounter#: [id number]      Primary Care Provider (PCP):  Quinton France MD, NPI# [id number]      Referring Provider:  Quinton France MD# [id number]   Visit type:  Scheduled follow-up.    Accompanied by:  No one.    Source of history:  Self.    Referral source:  Self.    History limitation:  None.       Chief Complaint   3/11/2019 2:25 PM CDT    Pt here  for med ck        History of Present Illness             The patient presents for follow-up evaluation of diabetes.  The quality of the patient's diabetes is described as being unchanged from previous visit.  Relieving factors consist of diet changes, increased activity and medication.  Associated symptoms consist of none.  Medical encounters: none.  Compliance problems: none.  Glucose results: within target range.  Hemoglobin A1c results: > 6% and within target range.  Lifestyle modification: weight reduction, diet, increased physical activity.  Medications: see medication list .  Additional pertinent history: last eye exam: 6/18/2018 and last podiatric foot exam: 3/11/2019.  No reported episodes of hypoglycemia.               The patient presents for follow-up evaluation of hypertension.  The quality of hypertension symptom(s) since the patient's last visit is described as being unchanged.  The severity of the hypertension symptom(s) since the last visit is moderate.  Since the patient's last visit, the timing/course of hypertension symptom(s) is constant.  Exacerbating factors consist of none.  Relieving factors consist of medication.  Associated symptoms consist of none.  Prior treatment consists of lifestyle modification (weight reduction, dietary sodium restriction, increased physical activity, adoption of DASH eating plan).  Medical encounters: none.  Compliance problems: none.        Review of Systems   Constitutional:  Negative.    Eye:  Negative.    Ear/Nose/Mouth/Throat:  Negative.    Respiratory:  Negative.    Cardiovascular:  Negative.    Gastrointestinal:  Negative.    Genitourinary:  Negative.    Hematology/Lymphatics:  Negative.    Endocrine:  Negative.    Immunologic:  Negative.    Musculoskeletal:  Negative.    Integumentary:  Negative.    Neurologic:  Negative.    Psychiatric:  Negative.    All other systems reviewed and negative      Health Status   Allergies:    Allergic Reactions  (Selected)  Severity Not Documented  Simvastatin (Myalgia)  Statins (No reactions were documented)   Medications:  (Selected)   Prescriptions  Prescribed  aspirin 81 mg oral enteric coated tablet: 1 tab(s) ( 81 mg ), PO, Daily, # 30 tab(s), 0 Refill(s), Type: Maintenance, OTC (Rx)  atenolol 50 mg oral tablet: = 1 tab(s) ( 50 mg ), Oral, daily, # 90 tab(s), 1 Refill(s), Type: Maintenance, Pharmacy: [facility], 1 tab(s) Oral daily  cpap supplies: cpap supplies, See Instructions, Instructions: chamber, hoses and filters, cushions, nasal supllies, Supply, # 1 box(es), 11 Refill(s), Type: Maintenance  metFORMIN 500 mg oral tablet: = 2 tab(s) ( 1,000 mg ), po, bid, # 360 tab(s), 1 Refill(s), Type: Maintenance, Pharmacy: [facility], Pt due for appt, 2 tab(s) Oral bid   Problem list:    All Problems  Diabetes / SNOMED CT 362984088 / Confirmed  Diabetes mellitus type 2 / SNOMED CT 411364138 / Confirmed  Fatty Liver / ICD-9-.8 / Confirmed  HTN (Hypertension) / ICD-9-.9 / Confirmed  Hyperlipidemia / SNOMED CT 77256023 / Confirmed  Obesity / ICD-9-.00 / Confirmed  FRANCISCO J (Obstructive Sleep Apnea) / ICD-9-.23 / Confirmed  Canceled: Hyperlipemia / ICD-9-.4      Histories   Past Medical History:    Active  HTN (Hypertension) (401.9)  Obesity (278.00)  Fatty Liver (571.8)  Diabetes mellitus type 2 (844698364)  FRANCISCO J (Obstructive Sleep Apnea) (327.23)   Family History:    Cancer  Father ()  Diabetes mellitus type II  Mother     Procedure history:    Colonoscopy (SNOMED CT 158026623) in  at 48 Years.  Rotator cuff repair (SNOMED CT 728082765) in  at 42 Years.  Comments:  2010 2:51 PM CDT - Quinton France MD  Right shoulder  Arthroscopy (SNOMED CT 90339879) in  at 26 Years.  Comments:  6/3/2010 2:52 PM CDT - Darcy Berrios LPN  left knee  Concussion (SNOMED CT 238687657) in  at 13 Years.   Social History:        Alcohol Assessment: Current            Current       Tobacco Assessment: Denies Tobacco Use            Never      Exercise and Physical Activity Assessment: Occasional exercise      Physical Examination   Vital Signs   3/11/2019 2:25 PM CDT Peripheral Pulse Rate 60 bpm    Systolic Blood Pressure 122 mmHg    Diastolic Blood Pressure 70 mmHg    Mean Arterial Pressure 87 mmHg    BP Site Right arm    Oxygen Saturation 97 %      Measurements from flowsheet : Measurements   3/11/2019 2:25 PM CDT Height Measured - Standard 68 in    Weight Measured - Standard 239.4 lb    BSA 2.28 m2    Body Mass Index 36.4 kg/m2  HI      General:  Alert and oriented, No acute distress.    HENT:  Normocephalic.    Neck:  Supple, No lymphadenopathy, No thyromegaly.    Respiratory:  Lungs are clear to auscultation, Respirations are non-labored, Breath sounds are equal, Symmetrical chest wall expansion.    Cardiovascular:  Normal rate, Regular rhythm, No murmur, No gallop, Good pulses equal in all extremities, Normal peripheral perfusion, No edema.    Gastrointestinal:  Soft, Non-tender, Non-distended, Normal bowel sounds, No organomegaly.    Musculoskeletal:  Normal range of motion, No swelling, No deformity, Normal gait.    Integumentary:  Warm, Dry, Pink, No foot ulcers or skin lesions..    Feet:  Normal by visual exam, Sensation intact, By monofilament exam.    Neurologic:  Alert, Oriented, Normal sensory, Normal motor function, No focal deficits.    Psychiatric:  Cooperative, Appropriate mood & affect.       Review / Management   Results review:  Lab results   3/4/2019 2:51 PM CST Sodium Level 138 mmol/L    Potassium Level 3.9 mmol/L    Chloride Level 103 mmol/L    CO2 Level 26 mmol/L    Glucose Level 94 mg/dL    BUN 16 mg/dL    Creatinine Level 0.77 mg/dL    BUN/Creat Ratio NOT APPLICABLE    eGFR 99 mL/min/1.73m2    eGFR African American 115 mL/min/1.73m2    Calcium Level 9.4 mg/dL    Bilirubin Total 0.8 mg/dL    Alkaline Phosphatase 76 unit/L    AST/SGOT 40 unit/L  HI    ALT/SGPT 38 unit/L     Protein Total 7.1 gm/dL    Albumin Level 4.4 gm/dL    Globulin 2.7    A/G Ratio 1.6    Hgb A1c 6.4  HI    Cholesterol 179 mg/dL    Non-HDL Cholesterol 133  HI    HDL 46 mg/dL    Cholesterol/HDL Ratio 3.9      HI    Triglyceride 74 mg/dL    PSA 0.6 ng/mL    U Microalbumin <0.2 mg/dL    Ur Creatinine 20 mg/dL    Ur Microalbumin/Creatinine Ratio NOTE    WBC 7.0    RBC 4.87    Hgb 14.4 gm/dL    Hct 43.1 %    MCV 88.5 fL    MCH 29.6 pg    MCHC 33.4 gm/dL    RDW 12.8 %    Platelet 221    MPV 9.6 fL   .

## 2022-02-15 NOTE — TELEPHONE ENCOUNTER
Entered by Britany Betancourt CMA on March 12, 2020 8:03:57 AM CDT  ---------------------  From: Britany Betancourt CMA   To: [facility]    Sent: 3/12/2020 8:03:57 AM CDT  Subject: Medication Management     ** Submitted: **  Order:metFORMIN (metFORMIN 500 mg oral tablet)  2 tab(s)  Oral  bid  Qty:  360 tab(s)        Days Supply:  90        Refills:  1          Substitutions Allowed     Route To [facility]    Signed by Britany Betancourt CMA  3/12/2020 8:03:00 AM    ** Submitted: **  Complete:metFORMIN (metFORMIN 500 mg oral tablet)   Signed by Britany Betancourt CMA  3/12/2020 8:03:00 AM    ** Not Approved:  **  metFORMIN (METFORMIN 500MG)  TAKE TWO (2) TABLETS TWICE DAILY  Qty:  360 tab(s)        Days Supply:  90        Refills:  0          Substitutions Allowed     Route To [facility]   Signed by Britany Betancourt CMA            ** Submitted: **  Order:atenolol (atenolol 50 mg oral tablet)  1 tab(s)  Oral  daily  Qty:  90 tab(s)        Days Supply:  90        Refills:  1          Substitutions Allowed     Route To [facility]    Signed by Britany Betancourt CMA  3/12/2020 8:03:00 AM    ** Submitted: **  Complete:atenolol (atenolol 50 mg oral tablet)   Signed by Britany Betancourt CMA  3/12/2020 8:03:00 AM    ** Not Approved:  **  atenolol (ATENOLOL 50MG)  ONE (1) TAB(S) ORAL DAILY  Qty:  90 tab(s)        Days Supply:  90        Refills:  0          Substitutions Allowed     Route To [facility]   Signed by Britany Betancourt CMA            ------------------------------------------  From: [facility]  To: Quinton France MD  Sent: March 11, 2020 3:50:33 PM CDT  Subject: Medication Management  Due: March 12, 2020 3:50:33 PM CDT    ** On Hold Pending Signature **  Drug: metFORMIN (metFORMIN 500 mg oral tablet)  TAKE TWO (2) TABLETS TWICE DAILY  Quantity: 360 tab(s)  Days Supply: 90  Refills: 0  Substitutions Allowed  Notes from Pharmacy:      Dispensed Drug: metFORMIN (metFORMIN 500 mg oral tablet)  TAKE TWO (2) TABLETS TWICE DAILY  Quantity: 360 tab(s)  Days Supply: 90  Refills: 0  Substitutions Allowed  Notes from Pharmacy:     ** On Hold Pending Signature **  Drug: atenolol (atenolol 50 mg oral tablet)  ONE (1) TAB(S) ORAL DAILY  Quantity: 90 tab(s)  Days Supply: 90  Refills: 0  Substitutions Allowed  Notes from Pharmacy:     Dispensed Drug: atenolol (atenolol 50 mg oral tablet)  ONE (1) TAB(S) ORAL DAILY  Quantity: 90 tab(s)  Days Supply: 90  Refills: 0  Substitutions Allowed  Notes from Pharmacy:   ------------------------------------------

## 2022-02-15 NOTE — TELEPHONE ENCOUNTER
---------------------  From: Janee Steiner CMA (Phone Messages Pool ([id number]))   To: [facility] Message Pool ([id number]_[place] [facility] [place]);     Sent: 3/25/2021 3:03:11 PM CDT  Subject: General Message-d/c med     Phone Message    PCP:   Axel      Time of Call:  1435       Person Calling:  self  Phone number:  [phone]    Note:   Pt calling to see about going off his atenolol like discussed at last visit and asking about going off Metformin also and when you would want him to f/u.   States he's down to 215#, eliminating all sugar.  Has BP cuff at home.   Please advise    Last office visit and reason:  3/8/21---------------------  From: Elizabeth Ferrell CMA ([facility] Message Pool ([id number]))   To: Cedrick Burris MD;     Sent: 3/25/2021 3:05:35 PM CDT  Subject: FW: General Message-d/c med---------------------  From: Cedrick Burris MD   To: [facility] Message Pool ([id number] [facility] [place]);     Sent: 3/25/2021 3:44:11 PM CDT  Subject: RE: General Message-d/c med     check his blood pressure 2 times per week  goal is less than 140/90  check A1c in 3 monthsLMTCB at 3:53pm. I wanted to let him know he can stop and than instruction from Axelpt calls back and reviewed Axel's instructions - asked him to c/b if having any concerns  RTC for A1c placed for 3mos

## 2022-02-15 NOTE — PROGRESS NOTES
Patient:   NATE AMARAL            MRN: [id number]            FIN: [id number]               Age:   58 years     Sex:  Male     :  1960   Associated Diagnoses:   HTN (Hypertension); Diabetes mellitus type 2   Author:   Quinton France MD      Impression and Plan   Diagnosis     HTN (Hypertension) ([id number]-IO I10).     Course:  Well controlled.    Orders     Orders   Charges (Evaluation and Management):  80630 office outpatient visit 15 minutes (Charge) (Order): Quantity: 1, HTN (Hypertension)  Diabetes mellitus type 2.     Orders (Selected)   Prescriptions  Prescribed  atenolol 50 mg oral tablet: = 1 tab(s) ( 50 mg ), Oral, daily, # 90 tab(s), 1 Refill(s), Type: Maintenance, Pharmacy: [facility], 1 tab(s) Oral daily.     Diagnosis     Diabetes mellitus type 2 (HKF43-HO E11.9).     Orders     Orders (Selected)   Prescriptions  Prescribed  metFORMIN 500 mg oral tablet: = 2 tab(s) ( 1,000 mg ), po, bid, # 360 tab(s), 1 Refill(s), Type: Maintenance, Pharmacy: [facility], Pt due for appt, 2 tab(s) Oral bid.        Visit Information      Date of Service: 2018 04:12 pm  Performing Location: [facility]  Encounter#: [id number]      Primary Care Provider (PCP):  Quinton France MD, NPI# [id number]      Referring Provider:  Quinton France MD# [id number]   Visit type:  Scheduled follow-up.    Accompanied by:  No one.    Source of history:  Self.    Referral source:  Self.    History limitation:  None.       Chief Complaint   2018 4:41 PM CDT    Pt here for med ck        History of Present Illness             The patient presents for follow-up evaluation of diabetes.  The quality of the patient's diabetes is described as being unchanged from previous visit.  Relieving factors consist of diet changes, increased activity and medication.  Associated symptoms consist of none.  Medical encounters: none.  Compliance problems: none.  Glucose results: within target range.   Hemoglobin A1c results: > 6% and within target range.  Lifestyle modification: weight reduction, diet, increased physical activity.  Medications: see medication list .  Additional pertinent history: last eye exam: 6/15/2017, last podiatric foot exam: 2/28/2018 and eye exam recommended.  No reported episodes of hypoglycemia.               The patient presents for follow-up evaluation of hypertension.  The quality of hypertension symptom(s) since the patient's last visit is described as being unchanged.  The severity of the hypertension symptom(s) since the last visit is moderate.  Since the patient's last visit, the timing/course of hypertension symptom(s) is constant.  Exacerbating factors consist of none.  Relieving factors consist of medication.  Associated symptoms consist of none.  Prior treatment consists of lifestyle modification (weight reduction, dietary sodium restriction, increased physical activity, adoption of DASH eating plan).  Medical encounters: none.  Compliance problems: none.        Review of Systems   Constitutional:  Negative.    Eye:  Negative.    Ear/Nose/Mouth/Throat:  Negative.    Respiratory:  Negative.    Cardiovascular:  Negative.    Gastrointestinal:  Negative.    Genitourinary:  Negative.    Hematology/Lymphatics:  Negative.    Endocrine:  Negative.    Immunologic:  Negative.    Musculoskeletal:  Negative.    Integumentary:  Negative.    Neurologic:  Negative.    Psychiatric:  Negative.    All other systems reviewed and negative      Health Status   Allergies:    Allergic Reactions (Selected)  Severity Not Documented  Simvastatin (Myalgia)   Medications:  (Selected)   Prescriptions  Prescribed  aspirin 81 mg oral enteric coated tablet: 1 tab(s) ( 81 mg ), PO, Daily, # 30 tab(s), 0 Refill(s), Type: Maintenance, OTC (Rx)  atenolol 50 mg oral tablet: = 1 tab(s) ( 50 mg ), Oral, daily, # 90 tab(s), 1 Refill(s), Type: Maintenance, Pharmacy: [facility], 1 tab(s) Oral daily  cpap  supplies: cpap supplies, See Instructions, Instructions: chamber, hoses and filters, cushions, nasal supllies, Supply, # 1 box(es), 11 Refill(s), Type: Maintenance  metFORMIN 500 mg oral tablet: = 2 tab(s) ( 1,000 mg ), po, bid, # 360 tab(s), 1 Refill(s), Type: Maintenance, Pharmacy: [facility], Pt due for appt, 2 tab(s) Oral bid   Problem list:    All Problems  Diabetes / SNOMED CT 957742371 / Confirmed  Diabetes mellitus type 2 / SNOMED CT 979167010 / Confirmed  Fatty Liver / ICD-9-.8 / Confirmed  HTN (Hypertension) / ICD-9-.9 / Confirmed  Hyperlipidemia / SNOMED CT 58389190 / Confirmed  Obesity / ICD-9-.00 / Confirmed  FRANCISCO J (Obstructive Sleep Apnea) / ICD-9-.23 / Confirmed  Canceled: Hyperlipemia / ICD-9-.4      Histories   Past Medical History:    Active  HTN (Hypertension) (401.9)  Obesity (278.00)  Fatty Liver (571.8)  Diabetes mellitus type 2 (051919140)  FRANCISCO J (Obstructive Sleep Apnea) (327.23)   Family History:    Cancer  Father ()  Diabetes mellitus type II  Mother     Procedure history:    Colonoscopy (SNOMED CT 949585838) in  at 48 Years.  Rotator cuff repair (SNOMED CT 729272315) in  at 42 Years.  Comments:  2010 2:51 PM - Quinton Farnce MD  Right shoulder  Arthroscopy (SNOMED CT 33833872) in  at 26 Years.  Comments:  6/3/2010 2:52 PM - Darcy Berrios LPN  left knee  Concussion (SNOMED CT 727678235) in  at 13 Years.   Social History:        Alcohol Assessment: Current            Current      Tobacco Assessment: Denies Tobacco Use            Never      Exercise and Physical Activity Assessment: Occasional exercise        Physical Examination   Vital Signs   2018 4:41 PM CDT Peripheral Pulse Rate 61 bpm    Systolic Blood Pressure 124 mmHg    Diastolic Blood Pressure 82 mmHg  HI    Mean Arterial Pressure 96 mmHg    BP Site Right arm    Oxygen Saturation 98 %      Measurements from flowsheet : Measurements   2018 4:41 PM CDT Height  Measured - Standard 68 in    Weight Measured - Standard 247.6 lb    BSA 2.32 m2    Body Mass Index 37.64 kg/m2  HI      General:  Alert and oriented, No acute distress.    HENT:  Normocephalic.    Neck:  Supple, No lymphadenopathy, No thyromegaly.    Respiratory:  Lungs are clear to auscultation, Respirations are non-labored, Breath sounds are equal, Symmetrical chest wall expansion.    Cardiovascular:  Normal rate, Regular rhythm, No murmur, No gallop, Good pulses equal in all extremities, Normal peripheral perfusion, No edema.    Gastrointestinal:  Soft, Non-tender, Non-distended, Normal bowel sounds, No organomegaly.    Musculoskeletal:  Normal range of motion, No swelling, No deformity, Normal gait.    Integumentary:  Warm, Dry, Pink, No foot ulcers or skin lesions..    Feet:  Normal by visual exam, Sensation intact, By monofilament exam.    Neurologic:  Alert, Oriented, Normal sensory, Normal motor function, No focal deficits.    Psychiatric:  Cooperative, Appropriate mood & affect.       Review / Management   Results review:  Lab results   8/23/2018 4:24 PM CDT Sodium Level 138 mmol/L    Potassium Level 3.9 mmol/L    Chloride Level 101 mmol/L    CO2 Level 25 mmol/L    Glucose Level 101 mg/dL  HI    BUN 20 mg/dL    Creatinine Level 0.82 mg/dL    BUN/Creat Ratio NOT APPLICABLE    eGFR 97 mL/min/1.73m2    eGFR African American 112 mL/min/1.73m2    Calcium Level 8.9 mg/dL    Bilirubin Total 1.1 mg/dL    Alkaline Phosphatase 81 unit/L    AST/SGOT 40 unit/L  HI    ALT/SGPT 39 unit/L    Protein Total 7.2 gm/dL    Albumin Level 4.3 gm/dL    Globulin 2.9    A/G Ratio 1.5    Hgb A1c 6.3  HI    Cholesterol 173 mg/dL    Non-HDL Cholesterol 129    HDL 44 mg/dL    Cholesterol/HDL Ratio 3.9      HI    Triglyceride 99 mg/dL    WBC 8.1    RBC 5.10    Hgb 15.4 gm/dL    Hct 45.3 %    MCV 88.8 fL    MCH 30.2 pg    MCHC 34.0 gm/dL    RDW 12.5 %    Platelet 217    MPV 9.4 fL   .

## 2022-02-15 NOTE — CARE COORDINATION
Pt appears on [facility] chronic disease panel as out of parameters for LDL.  Pt had labs 8/23/18 LDL was 110

## 2022-02-15 NOTE — TELEPHONE ENCOUNTER
---------------------  From: Quinton France MD   To: NATE AMARAL    Sent: 3/3/2021 3:25:17 PM CST  Subject: General Message       All results are within acceptable limits. No treatment changes are recommended at this time.      Results:  Date Result Name Ind Value Ref Range   3/2/2021 2:25 PM Sodium Level  138 mmol/L (135 - 146)   3/2/2021 2:25 PM Potassium Level  4.3 mmol/L (3.5 - 5.3)   3/2/2021 2:25 PM Chloride Level  103 mmol/L (98 - 110)   3/2/2021 2:25 PM CO2 Level  27 mmol/L (20 - 32)   3/2/2021 2:25 PM Glucose Level  95 mg/dL (65 - 99)   3/2/2021 2:25 PM BUN  13 mg/dL (7 - 25)   3/2/2021 2:25 PM Creatinine Level  0.82 mg/dL (0.70 - 1.25)   3/2/2021 2:25 PM BUN/Creat Ratio  NOT APPLICABLE (6 - 22)   3/2/2021 2:25 PM eGFR  95 mL/min/1.73m2 (> OR = 60 - )   3/2/2021 2:25 PM eGFR African American  111 mL/min/1.73m2 (> OR = 60 - )   3/2/2021 2:25 PM Calcium Level  9.4 mg/dL (8.6 - 10.3)   3/2/2021 2:25 PM Bilirubin Total  1.2 mg/dL (0.2 - 1.2)   3/2/2021 2:25 PM Alkaline Phosphatase  95 unit/L (35 - 144)   3/2/2021 2:25 PM AST/SGOT  34 unit/L (10 - 35)   3/2/2021 2:25 PM ALT/SGPT  30 unit/L (9 - 46)   3/2/2021 2:25 PM Protein Total  7.1 gm/dL (6.1 - 8.1)   3/2/2021 2:25 PM Albumin Level  4.4 gm/dL (3.6 - 5.1)   3/2/2021 2:25 PM Globulin  2.7 (1.9 - 3.7)   3/2/2021 2:25 PM A/G Ratio  1.6 (1.0 - 2.5)   3/2/2021 2:25 PM Hgb A1c ((H)) 6.2 ( - <5.7)   3/2/2021 2:25 PM Cholesterol  150 mg/dL ( - <200)   3/2/2021 2:25 PM Non-HDL Cholesterol  109 ( - <130)   3/2/2021 2:25 PM HDL  41 mg/dL (> OR = 40 - )   3/2/2021 2:25 PM Cholesterol/HDL Ratio  3.7 ( - <5.0)   3/2/2021 2:25 PM LDL  88    3/2/2021 2:25 PM Triglyceride  118 mg/dL ( - <150)   3/2/2021 2:25 PM PSA  0.6 ng/mL ( - < OR = 4.0)   3/2/2021 2:25 PM U Creatinine  25 mg/dL (20 - 320)   3/2/2021 2:25 PM U Microalbumin  <0.2 mg/dL (See Note: - )   3/2/2021 2:25 PM Ur Microalbumin/Creatinine Ratio  NOTE ( - <30)   3/2/2021 2:25 PM WBC  8.0 (3.8 - 10.8)   3/2/2021  2:25 PM RBC  4.95 (4.20 - 5.80)   3/2/2021 2:25 PM Hgb  15.1 gm/dL (13.2 - 17.1)   3/2/2021 2:25 PM Hct  44.4 % (38.5 - 50.0)   3/2/2021 2:25 PM MCV  89.7 fL (80.0 - 100.0)   3/2/2021 2:25 PM MCH  30.5 pg (27.0 - 33.0)   3/2/2021 2:25 PM MCHC  34.0 gm/dL (32.0 - 36.0)   3/2/2021 2:25 PM RDW  12.4 % (11.0 - 15.0)   3/2/2021 2:25 PM Platelet  204 (140 - 400)   3/2/2021 2:25 PM MPV  10.3 fL (7.5 - 12.5)

## 2022-02-15 NOTE — NURSING NOTE
Comprehensive Intake Entered On:  3/11/2019 2:29 PM CDT    Performed On:  3/11/2019 2:25 PM CDT by Britany Betancourt CMA               Summary   Chief Complaint :   Pt here for med ck   Weight Measured :   239.4 lb(Converted to: 239 lb 6 oz, 108.59 kg)    Height Measured :   68 in(Converted to: 5 ft 8 in, 172.72 cm)    Body Mass Index :   36.4 kg/m2 (HI)    Body Surface Area :   2.28 m2   Systolic Blood Pressure :   122 mmHg   Diastolic Blood Pressure :   70 mmHg   Mean Arterial Pressure :   87 mmHg   Peripheral Pulse Rate :   60 bpm   BP Site :   Right arm   Oxygen Saturation :   97 %   Britany Betancourt CMA - 3/11/2019 2:25 PM CDT   Health Status   Allergies Verified? :   Yes   Medication History Verified? :   Yes   Immunizations Current :   Yes   Medical History Verified? :   Yes   Pre-Visit Planning Status :   Completed   Tobacco Use? :   Never smoker   Britany Betancourt CMA - 3/11/2019 2:25 PM CDT   Consents   Consent for Immunization Exchange :   Consent Granted   Consent for Immunizations to Providers :   Consent Granted   Britany Betancourt CMA - 3/11/2019 2:25 PM CDT   Meds / Allergies   (As Of: 3/11/2019 2:29:54 PM CDT)   Allergies (Active)   simvastatin  Estimated Onset Date:   Unspecified ; Reactions:   Myalgia ; Created By:   Marcelle Davenport MD; Reaction Status:   Active ; Category:   Drug ; Substance:   simvastatin ; Type:   Allergy ; Updated By:   Marcelle Davenport MD; Reviewed Date:   3/11/2019 2:26 PM CDT        Medication List   (As Of: 3/11/2019 2:29:54 PM CDT)   Prescription/Discharge Order    aspirin  :   aspirin ; Status:   Prescribed ; Ordered As Mnemonic:   aspirin 81 mg oral enteric coated tablet ; Simple Display Line:   81 mg, 1 tab(s), PO, Daily, 30 tab(s) ; Ordering Provider:   Quinton France MD; Catalog Code:   aspirin ; Order Dt/Tm:   2/21/2012 4:14:44 PM          atenolol  :   atenolol ; Status:   Prescribed ; Ordered As Mnemonic:   atenolol 50 mg oral tablet ; Simple Display Line:   50 mg, 1  tab(s), Oral, daily, 90 tab(s), 1 Refill(s) ; Ordering Provider:   Quinton France MD; Catalog Code:   atenolol ; Order Dt/Tm:   8/29/2018 5:07:18 PM          metFORMIN  :   metFORMIN ; Status:   Prescribed ; Ordered As Mnemonic:   metFORMIN 500 mg oral tablet ; Simple Display Line:   1,000 mg, 2 tab(s), po, bid, 360 tab(s), 1 Refill(s) ; Ordering Provider:   Quinton France MD; Catalog Code:   metFORMIN ; Order Dt/Tm:   8/29/2018 5:07:09 PM          Miscellaneous Rx Supply  :   Miscellaneous Rx Supply ; Status:   Prescribed ; Ordered As Mnemonic:   cpap supplies ; Simple Display Line:   See Instructions, chamber, hoses and filters, cushions, nasal supllies, 1 box(es), 11 Refill(s) ; Ordering Provider:   Quinton France MD; Catalog Code:   Miscellaneous Rx Supply ; Order Dt/Tm:   5/1/2018 12:12:12 PM ; Comment:   please supply pt with Cpap supplies x 1 year

## 2022-02-15 NOTE — LETTER
(Inserted Image. Unable to display)     March 01, 2019      NATE AMARAL  [address]  [place], [place] [id number]          Dear NATE,      Thank you for selecting [facility] (previously [place] [place] [facility]) for your healthcare needs.    Our records indicate you are due for the following services:    Diabetic Exam ~ Please bring your glucose meter and/or your blood glucose diary to your appointment.  Hypertension check ~ please remember to bring your at-home blood pressure readings with you to your appointment.    Urine Labs ~ Please be prepared to leave a urine specimen for evaluation.  Fasting Lab Tests ~ Please do not eat or drink anything 10 hours prior to your scheduled appointment time.  (Water and any medications that you may need are allowed unless directed otherwise.)    If you had your labs done at another facility or with Direct Access Lab Testing at [facility], please bring in a copy of the results to your next visit, mail a copy, or drop off a copy of your results to your Healthcare Provider.    You are due for lab work and an office visit; please schedule the lab appointment 1 week before the office visit.  This will assure all results are available to discuss with your provider during your visit.    **It is very helpful if you bring your medication bottles to your appointment.  This assures we have all of your current medications, including strength and dosing information, documented accurately in your medical record.    To schedule an appointment or if you have further questions, please contact your primary clinic:   [facility]       [phone]   [facility]       [phone]              [facility]     [phone]      Powered by [employer]    Sincerely,    Quinton France MD

## 2022-02-15 NOTE — PROGRESS NOTES
Chief Complaint    Med check  History of Present Illness       Patient is here for refill of his medications.       Patient takes Metformin and atenolol aspirin and uses CPAP.  He does have hypertension diabetes mellitus obstructive sleep apnea and hyperlipidemia.       Patient's wife has had poor health and he wants to donate a kidney to her.  He was told he needed to be much healthier so he is actually lost 40 pounds and is working out regularly.  Says he does feel better  Review of Systems       See HPI.  All other review of systems negative.    Physical Exam   Vitals & Measurements    HR: 62 (Peripheral)  BP: 118/60     HT: 68 in  WT: 226.0 lb  BMI: 34.36        General: Alert and oriented, No acute distress.       Eye: Pupils are equal, round and reactive to light, Normal conjunctiva.       HENT: Oral mucosa is moist.       Neck: Supple.       Respiratory: Respirations are non-labored.       Cardiovascular: Normal rate, Regular rhythm, No edema.       Gastrointestinal: Non-distended.       Musculoskeletal: Normal gait.       Integumentary: Warm, No rash.       Psychiatric: Cooperative, Appropriate mood & affect, Normal judgment  Assessment/Plan       1. FRANCISCO J (Obstructive Sleep Apnea) (G47.33)          He is using CPAP still getting good response we discussed his goals of getting down to 200 pounds and how he might be able to get off CPAP at that but there are then people who still needed                2. Diabetes mellitus type 2 (E11.9)          Most recent A1c was 6.2 that was achieved while taking Metformin told him that he would need more weight loss and improve muscle have a chance of getting off the Metformin.  But he certainly has a recurrence place now.  He will continue with aspirin therapy.  I note his LDL is 88 and because he has had myalgia on simvastatin some statins in general he is not interested in trying cholesterol medicine         Ordered:          Return to Clinic (Request), RFV: FRANCISCO J, Return  in 1 year                3. HTN (Hypertension) (I10)          Blood pressure is excellent control he can continue on his atenolol 50 mg a day I told him he could cut back to 25 for 2 weeks and then stop and see if his blood pressure is still controlled without medication he is thinking about this         Ordered:          Return to Clinic (Request), RFV: FRANCISCO J, Return in 1 year                4. Hyperlipidemia (E78.5)          LDL 88 HDL 41 due to his myalgias on statins have not been trying them and he has good results         Ordered:          Return to Clinic (Request), RFV: FRANCISCO J, Return in 1 year                Orders:         atenolol, = 1 tab(s), Oral, daily, # 90 tab(s), 1 Refill(s), Type: Maintenance, Pharmacy: [facility], 1 tab(s) Oral daily, 68, in, 03/08/21 15:44:00 CST, Height Measured, 226, lb, 03/08/21 15:44:00 CST, Weight Measured, (Ordered)         atenolol, = 1 tab(s), Oral, daily, # 90 tab(s), 1 Refill(s), Type: Hard Stop, Pharmacy: [facility], 68, in, 09/12/19 14:25:00 CDT, Height Measured, 239, lb, 09/12/19 14:25:00 CDT, Weight Measured, (Completed)         metFORMIN, = 2 tab(s), Oral, bid, # 360 tab(s), 1 Refill(s), Type: Maintenance, Pharmacy: [facility], 2 tab(s) Oral bid, 68, in, 03/08/21 15:44:00 CST, Height Measured, 226, lb, 03/08/21 15:44:00 CST, Weight Measured, (Ordered)         metFORMIN, = 2 tab(s), Oral, bid, # 360 tab(s), 1 Refill(s), Type: Hard Stop, Pharmacy: [facility], 68, in, 09/12/19 14:25:00 CDT, Height Measured, 239, lb, 09/12/19 14:25:00 CDT, Weight Measured, (Completed)  Patient Information     Name:NATE AMARAL      Address:      [address] [place] [id number]     Sex:Male     YOB: 1960     [phone]     Emergency Contact:SPENCER AMARAL     MRN:[id number]     FIN:[id number]     Location:[facility]     Date of Service:03/08/2021      Primary Care Physician:       Quinton France MD, [phone]      Attending Physician:       Cedrick Burris MD, [phone]  Problem List/Past Medical History    Ongoing     Diabetes mellitus type 2     Fatty Liver     HTN (Hypertension)     Hyperlipidemia     Myalgia due to statin     Obesity     FRANCISCO J (Obstructive Sleep Apnea)    Historical     Diabetes  Procedure/Surgical History     Colonoscopy (2009)     Rotator cuff repair (2003)      Comments: Right shoulder.     Arthroscopy (1987)      Comments: left knee.     Concussion (1974)  Medications    aspirin 81 mg oral enteric coated tablet, 81 mg= 1 tab(s), Oral, daily    atenolol 50 mg oral tablet, 1 tab(s), Oral, daily, 1 refills    cpap supplies, See Instructions, 11 refills    metFORMIN 500 mg oral tablet, 2 tab(s), Oral, bid, 1 refills  Allergies    simvastatin (Myalgia)    statins  Social History    Smoking Status     Never smoker     Alcohol - Current      Current     Electronic Cigarette/Vaping      Electronic Cigarette Use: Never.     Exercise - Occasional exercise     Tobacco - Denies Tobacco Use      Never (less than 100 in lifetime)  Family History    Cancer: Father.    Diabetes mellitus type II: Mother.  Immunizations      Vaccine Date Status          tetanus/diphth/pertuss (Tdap) adult/adol 05/12/2015 Given          influenza virus vaccine, inactivated 09/25/2012 Recorded          influenza virus vaccine, inactivated 09/08/2010 Given          Td 11/11/2005 Recorded          influenza virus vaccine, inactivated 11/03/2005 Recorded          influenza virus vaccine, inactivated 01/19/2005 Recorded          pneumococcal (PPSV23) 11/04/2003 Recorded          Hep B 08/22/2002 Recorded              Comments : [place] Immunization Records          Hep B 03/21/2002 Recorded              Comments : [place] Immunization [place]          Hep B 02/21/2002 Recorded              Comments : [place] Immunization Records  Lab Results          Lab Results (Last 4 results within 90 days)           Sodium Level: 138 mmol/L [135  mmol/L - 146 mmol/L] (03/02/21 14:25:00)          Potassium Level: 4.3 mmol/L [3.5 mmol/L - 5.3 mmol/L] (03/02/21 14:25:00)          Chloride Level: 103 mmol/L [98 mmol/L - 110 mmol/L] (03/02/21 14:25:00)          CO2 Level: 27 mmol/L [20 mmol/L - 32 mmol/L] (03/02/21 14:25:00)          Glucose Level: 95 mg/dL [65 mg/dL - 99 mg/dL] (03/02/21 14:25:00)          BUN: 13 mg/dL [7 mg/dL - 25 mg/dL] (03/02/21 14:25:00)          Creatinine Level: 0.82 mg/dL [0.7 mg/dL - 1.25 mg/dL] (03/02/21 14:25:00)          BUN/Creat Ratio: NOT APPLICABLE [6  - 22] (03/02/21 14:25:00)          eGFR: 95 mL/min/1.73m2 (03/02/21 14:25:00)          eGFR African American: 111 mL/min/1.73m2 (03/02/21 14:25:00)          Calcium Level: 9.4 mg/dL [8.6 mg/dL - 10.3 mg/dL] (03/02/21 14:25:00)          Bilirubin Total: 1.2 mg/dL [0.2 mg/dL - 1.2 mg/dL] (03/02/21 14:25:00)          Alkaline Phosphatase: 95 unit/L [35 unit/L - 144 unit/L] (03/02/21 14:25:00)          AST/SGOT: 34 unit/L [10 unit/L - 35 unit/L] (03/02/21 14:25:00)          ALT/SGPT: 30 unit/L [9 unit/L - 46 unit/L] (03/02/21 14:25:00)          Protein Total: 7.1 gm/dL [6.1 gm/dL - 8.1 gm/dL] (03/02/21 14:25:00)          Albumin Level: 4.4 gm/dL [3.6 gm/dL - 5.1 gm/dL] (03/02/21 14:25:00)          Globulin: 2.7 [1.9  - 3.7] (03/02/21 14:25:00)          A/G Ratio: 1.6 [1  - 2.5] (03/02/21 14:25:00)          Hgb A1c: 6.2 High (03/02/21 14:25:00)          Cholesterol: 150 mg/dL (03/02/21 14:25:00)          Non-HDL Cholesterol: 109 (03/02/21 14:25:00)          HDL: 41 mg/dL (03/02/21 14:25:00)          Cholesterol/HDL Ratio: 3.7 (03/02/21 14:25:00)          LDL: 88 (03/02/21 14:25:00)          Triglyceride: 118 mg/dL (03/02/21 14:25:00)          PSA: 0.6 ng/mL (03/02/21 14:25:00)          U Creatinine: 25 mg/dL [20 mg/dL - 320 mg/dL] (03/02/21 14:25:00)          U Microalbumin: <0.2 (03/02/21 14:25:00)          Ur Microalbumin/Creatinine Ratio: NOTE (03/02/21 14:25:00)          WBC: 8  [3.8  - 10.8] (03/02/21 14:25:00)          RBC: 4.95 [4.2  - 5.8] (03/02/21 14:25:00)          Hgb: 15.1 gm/dL [13.2 gm/dL - 17.1 gm/dL] (03/02/21 14:25:00)          Hct: 44.4 % [38.5 % - 50 %] (03/02/21 14:25:00)          MCV: 89.7 fL [80 fL - 100 fL] (03/02/21 14:25:00)          MCH: 30.5 pg [27 pg - 33 pg] (03/02/21 14:25:00)          MCHC: 34 gm/dL [32 gm/dL - 36 gm/dL] (03/02/21 14:25:00)          RDW: 12.4 % [11 % - 15 %] (03/02/21 14:25:00)          Platelet: 204 [140  - 400] (03/02/21 14:25:00)          MPV: 10.3 fL [7.5 fL - 12.5 fL] (03/02/21 14:25:00)

## 2022-02-15 NOTE — NURSING NOTE
Vital Signs Entered On:  8/9/2021 4:30 PM CDT    Performed On:  8/9/2021 4:30 PM CDT by Cedrick Burris MD               Vital Signs   Systolic Blood Pressure :   132 mmHg (HI)    Diastolic Blood Pressure :   80 mmHg   Mean Arterial Pressure :   97 mmHg   Cedrick Burris MD - 8/9/2021 4:30 PM CDT

## 2022-02-15 NOTE — NURSING NOTE
Comprehensive Intake Entered On:  8/9/2021 4:23 PM CDT    Performed On:  8/9/2021 4:18 PM CDT by Genet Youngblood CMA               Summary   Chief Complaint :   Patient presents for follow up chronic diseases and patient went off all medications.   Weight Measured :   221.7 lb(Converted to: 221 lb 11 oz, 100.561 kg)    Height Measured :   68 in(Converted to: 5 ft 8 in, 172.72 cm)    Body Mass Index :   33.71 kg/m2 (HI)    Body Surface Area :   2.19 m2   Systolic Blood Pressure :   144 mmHg (HI)    Diastolic Blood Pressure :   70 mmHg   Mean Arterial Pressure :   95 mmHg   Peripheral Pulse Rate :   72 bpm   BP Site :   Right arm   BP Method :   Manual   HR Method :   Electronic   Temperature Tympanic :   98.1 DegF(Converted to: 36.7 DegC)    Oxygen Saturation :   98 %   Genet Youngblood CMA - 8/9/2021 4:18 PM CDT   Health Status   Allergies Verified? :   Yes   Medication History Verified? :   Yes   Immunizations Current :   Yes   Tobacco Use? :   Never smoker   Genet Youngblood CMA - 8/9/2021 4:18 PM CDT   Consents   Consent for Immunization Exchange :   Consent Granted   Consent for Immunizations to Providers :   Consent Granted   Genet Youngblood CMA - 8/9/2021 4:18 PM CDT   Meds / Allergies   (As Of: 8/9/2021 4:23:27 PM CDT)   Allergies (Active)   simvastatin  Estimated Onset Date:   Unspecified ; Reactions:   Myalgia ; Created By:   Marcelle Davenport MD; Reaction Status:   Active ; Category:   Drug ; Substance:   simvastatin ; Type:   Allergy ; Updated By:   Marcelle Davenport MD; Reviewed Date:   8/9/2021 4:21 PM CDT      statins  Estimated Onset Date:   Unspecified ; Created By:   Quinton France MD; Reaction Status:   Active ; Category:   Drug ; Substance:   statins ; Type:   Allergy ; Updated By:   Quinton France MD; Reviewed Date:   8/9/2021 4:21 PM CDT        Medication List   (As Of: 8/9/2021 4:23:27 PM CDT)   Prescription/Discharge Order    aspirin  :   aspirin ; Status:   Prescribed ; Ordered As Mnemonic:    aspirin 81 mg oral enteric coated tablet ; Simple Display Line:   81 mg, 1 tab(s), PO, Daily, 30 tab(s) ; Ordering Provider:   Quinton France MD; Catalog Code:   aspirin ; Order Dt/Tm:   2/21/2012 4:14:44 PM CST          atenolol  :   atenolol ; Status:   Prescribed ; Ordered As Mnemonic:   atenolol 50 mg oral tablet ; Simple Display Line:   1 tab(s), Oral, daily, 90 tab(s), 1 Refill(s) ; Ordering Provider:   Cedrick Burris MD; Catalog Code:   atenolol ; Order Dt/Tm:   3/8/2021 4:07:40 PM CST          metFORMIN  :   metFORMIN ; Status:   Prescribed ; Ordered As Mnemonic:   metFORMIN 500 mg oral tablet ; Simple Display Line:   2 tab(s), Oral, bid, 360 tab(s), 1 Refill(s) ; Ordering Provider:   Cedrick Burris MD; Catalog Code:   metFORMIN ; Order Dt/Tm:   3/8/2021 4:07:41 PM CST          Miscellaneous Rx Supply  :   Miscellaneous Rx Supply ; Status:   Prescribed ; Ordered As Mnemonic:   cpap supplies ; Simple Display Line:   See Instructions, chamber, hoses and filters, cushions, nasal supllies, 1 box(es), 11 Refill(s) ; Ordering Provider:   Quinton France MD; Catalog Code:   Miscellaneous Rx Supply ; Order Dt/Tm:   5/1/2018 12:12:12 PM CDT ; Comment:   please supply pt with Cpap supplies x 1 year

## 2022-02-15 NOTE — TELEPHONE ENCOUNTER
---------------------  From: Cedrick Burris MD   To: [facility] [employer] ([id number] - [place]);     Sent: 8/9/2021 4:34:46 PM CDT  Subject: General Message     replacement auto cpap machineWaiting on provider notes from 08.09.21.Left message for patient to call me back and discuss what DME company he uses for his CPAP.Spoke with patient he is interested in going through [facility]. Patient's information has been sent to [place].

## 2022-02-15 NOTE — PROGRESS NOTES
Chief Complaint    Patient presents for follow up chronic diseases and patient went off all medications.  History of Present Illness       Patient is here to discuss his health.  Patient's maximum weight was over 300 pounds he lost significant weight now in the last 6 months he thinks he is lost about another 40 pounds.  With this he feels better he has been physically active.  He is no longer snoring at night.  Patient went off all his meds 3 months ago.  He has had a recent A1c of 6.29 August 2, 2021.  He has been following his blood pressures at home and says almost always in the 130s and 80s.  Review of Systems       No chest pains, no shortness of breath, no daytime sleepiness, no weakness  Physical Exam   Vitals & Measurements    T: 98.1  F (Tympanic)  HR: 72 (Peripheral)  BP: 132/80  SpO2: 98%     HT: 68 in  WT: 221.7 lb  BMI: 33.71        General: Alert and oriented, No acute distress.       Eye: Pupils are equal, round and reactive to light, Normal conjunctiva.       HENT: Oral mucosa is moist.       Neck: Supple.       Respiratory: Respirations are non-labored.       Cardiovascular: Normal rate, Regular rhythm, No edema.       Gastrointestinal: Non-distended.       Musculoskeletal: Normal gait.       Integumentary: Warm, No rash.       Psychiatric: Cooperative, Appropriate mood & affect, Normal judgment  Assessment/Plan       1. FRANCISCO J (Obstructive Sleep Apnea) (G47.33)          He still using CPAP he wonders to get off of it but he thinks it helps him feel better and helps him even lose weight so he plans on continuing         Ordered:          Miscellaneous Rx Supply, Replacement Auto Titrating CPAP 6-16 for daily use, See Instructions, Instructions: Heated humidifier x1; Humidifier chamber x1; Heated tubing x1; Full face mask of choice with headgear x1; Cushion x 1; Filters: Disposable x1pk & Reusable x1pk. Le..., (Ordered)                2. Diabetes mellitus type 2 (E11.9)          His A1c is 6.2 he  should have a repeat Chem-8 and A1c in 6 months along with his lipid panel and a CBC                3. HTN (Hypertension) (I10)          Initial blood pressure reading was still slightly high and then it improved he is can continue to monitor his blood pressure at home we have discussed goals and I congratulated him on his healthier eating and increased activity and his weight loss           Patient Information     Name:NATE AMARAL      Address:      [address] [place] [phone]     Sex:Male     YOB: 1960     [phone]     Emergency Contact:SPENCER AMARAL     MRN:[id number]     FIN:[id number]     [facility]     Date of Service:08/09/2021      Primary Care Physician:       Cedrick Burris MD, [phone]      Attending Physician:       Cedrick Burris MD, [phone]  Problem List/Past Medical History    Ongoing     Diabetes mellitus type 2     Fatty Liver     HTN (Hypertension)     Hyperlipidemia     Myalgia due to statin     Obesity     FRANCISCO J (Obstructive Sleep Apnea)    Historical     Diabetes  Procedure/Surgical History     Colonoscopy (2009)     Rotator cuff repair (2003)      Comments: Right shoulder.     Arthroscopy (1987)      Comments: left knee.     Concussion (1974)  Medications    aspirin 81 mg oral enteric coated tablet, 81 mg= 1 tab(s), Oral, daily    atenolol 50 mg oral tablet, 1 tab(s), Oral, daily, 1 refills    cpap supplies, See Instructions, 11 refills    metFORMIN 500 mg oral tablet, 2 tab(s), Oral, bid, 1 refills    Replacement Auto Titrating CPAP 6-16 for daily use, See Instructions, 11 refills  Allergies    simvastatin (Myalgia)    statins  Social History    Smoking Status     Never smoker     Alcohol - Current      Current     Electronic Cigarette/Vaping      Electronic Cigarette Use: Never.     Exercise - Occasional exercise     Tobacco - Denies Tobacco Use      Never (less than 100 in lifetime)  Family History    Cancer:  Father.    Diabetes mellitus type II: Mother.  Lab Results          Lab Results (Last 4 results within 90 days)           Hgb A1c: 6.2 High (08/02/21 14:34:00)  Immunizations          Scheduled Immunizations          Dose Date(s)          Hep B          02/21/2002, 03/21/2002, 08/22/2002          influenza virus vaccine, inactivated          09/08/2010, 01/19/2005, 11/03/2005, 09/25/2012          Other Immunizations          pneumococcal (PPSV23)          11/04/2003          Td          11/11/2005          tetanus/diphth/pertuss (Tdap) adult/adol          05/12/2015

## 2022-02-15 NOTE — NURSING NOTE
Due to COVID-19 recommendations, meds have been refilled and appt will be scheduled at a later date

## 2022-02-15 NOTE — TELEPHONE ENCOUNTER
---------------------  From: Cedrick Burris MD   To: NATE AMARAL    Sent: 8/3/2021 8:46:19 PM CDT  Subject: General Message     Your blood sugar control is doing well.      Results:  Date Result Name Ind Value Ref Range   8/2/2021 2:34 PM Hgb A1c ((H)) 6.2 ( - <5.7)

## 2022-02-15 NOTE — TELEPHONE ENCOUNTER
---------------------  From: Quinton France MD   To: NATE AMARAL    Sent: 9/9/2019 11:21:54 AM CDT  Subject: Patient Message - Results Notification          All results are within acceptable limits. No treatment changes are recommended at this time.    Results:  Date Result Name Ind Value Ref Range   9/5/2019 2:45 PM Sodium Level  138 mmol/L (135 - 146)   9/5/2019 2:45 PM Potassium Level  4.0 mmol/L (3.5 - 5.3)   9/5/2019 2:45 PM Chloride Level  102 mmol/L (98 - 110)   9/5/2019 2:45 PM CO2 Level  27 mmol/L (20 - 32)   9/5/2019 2:45 PM Glucose Level ((H)) 108 mg/dL (65 - 99)   9/5/2019 2:45 PM BUN  19 mg/dL (7 - 25)   9/5/2019 2:45 PM Creatinine Level  0.95 mg/dL (0.70 - 1.25)   9/5/2019 2:45 PM BUN/Creat Ratio  NOT APPLICABLE (6 - 22)   9/5/2019 2:45 PM eGFR  87 mL/min/1.73m2 (> OR = 60 - )   9/5/2019 2:45 PM eGFR African American  100 mL/min/1.73m2 (> OR = 60 - )   9/5/2019 2:45 PM Calcium Level  9.3 mg/dL (8.6 - 10.3)   9/5/2019 2:45 PM Bilirubin Total  1.1 mg/dL (0.2 - 1.2)   9/5/2019 2:45 PM Alkaline Phosphatase  76 unit/L (40 - 115)   9/5/2019 2:45 PM AST/SGOT ((H)) 55 unit/L (10 - 35)   9/5/2019 2:45 PM ALT/SGPT  44 unit/L (9 - 46)   9/5/2019 2:45 PM Protein Total  7.4 gm/dL (6.1 - 8.1)   9/5/2019 2:45 PM Albumin Level  4.3 gm/dL (3.6 - 5.1)   9/5/2019 2:45 PM Globulin  3.1 (1.9 - 3.7)   9/5/2019 2:45 PM A/G Ratio  1.4 (1.0 - 2.5)   9/5/2019 2:45 PM Hgb A1c ((H)) 6.3 ( - <5.7)   9/5/2019 2:45 PM Cholesterol  175 mg/dL ( - <200)   9/5/2019 2:45 PM Non-HDL Cholesterol ((H)) 130 ( - <130)   9/5/2019 2:45 PM HDL  45 mg/dL (>40 - )   9/5/2019 2:45 PM Cholesterol/HDL Ratio  3.9 ( - <5.0)   9/5/2019 2:45 PM LDL ((H)) 107    9/5/2019 2:45 PM Triglyceride  115 mg/dL ( - <150)   9/5/2019 2:45 PM WBC  9.1 (3.8 - 10.8)   9/5/2019 2:45 PM RBC  5.08 (4.20 - 5.80)   9/5/2019 2:45 PM Hgb  15.7 gm/dL (13.2 - 17.1)   9/5/2019 2:45 PM Hct  46.0 % (38.5 - 50.0)   9/5/2019 2:45 PM MCV  90.6 fL (80.0 - 100.0)   9/5/2019 2:45  PM MCH  30.9 pg (27.0 - 33.0)   9/5/2019 2:45 PM MCHC  34.1 gm/dL (32.0 - 36.0)   9/5/2019 2:45 PM RDW  12.2 % (11.0 - 15.0)   9/5/2019 2:45 PM Platelet  207 (140 - 400)   9/5/2019 2:45 PM MPV  9.3 fL (7.5 - 12.5)

## 2022-02-15 NOTE — NURSING NOTE
Comprehensive Intake Entered On:  3/8/2021 3:47 PM CST    Performed On:  3/8/2021 3:44 PM CST by Elizabeth Ferrell CMA               Summary   Chief Complaint :   Med check   Weight Measured :   226.0 lb(Converted to: 226 lb 0 oz, 102.512 kg)    Height Measured :   68 in(Converted to: 5 ft 8 in, 172.72 cm)    Body Mass Index :   34.36 kg/m2 (HI)    Body Surface Area :   2.22 m2   Systolic Blood Pressure :   118 mmHg   Diastolic Blood Pressure :   60 mmHg   Mean Arterial Pressure :   79 mmHg   Peripheral Pulse Rate :   62 bpm   Elizabeth Ferrell CMA - 3/8/2021 3:44 PM CST   Health Status   Allergies Verified? :   Yes   Medication History Verified? :   Yes   Immunizations Current :   Yes   Pre-Visit Planning Status :   Completed   Tobacco Use? :   Never smoker   Elizabeth Ferrell CMA - 3/8/2021 3:44 PM CST   Consents   Consent for Immunization Exchange :   Consent Granted   Consent for Immunizations to Providers :   Consent Granted   Elizabeth Ferrell CMA - 3/8/2021 3:44 PM CST   Meds / Allergies   (As Of: 3/8/2021 3:47:22 PM CST)   Allergies (Active)   simvastatin  Estimated Onset Date:   Unspecified ; Reactions:   Myalgia ; Created By:   Marcelle Davenport MD; Reaction Status:   Active ; Category:   Drug ; Substance:   simvastatin ; Type:   Allergy ; Updated By:   Marcelle Davenport MD; Reviewed Date:   9/12/2019 2:25 PM CDT      statins  Estimated Onset Date:   Unspecified ; Created By:   Quinton France MD; Reaction Status:   Active ; Category:   Drug ; Substance:   statins ; Type:   Allergy ; Updated By:   Quinton France MD; Reviewed Date:   9/12/2019 2:25 PM CDT        Medication List   (As Of: 3/8/2021 3:47:22 PM CST)   Prescription/Discharge Order    aspirin  :   aspirin ; Status:   Prescribed ; Ordered As Mnemonic:   aspirin 81 mg oral enteric coated tablet ; Simple Display Line:   81 mg, 1 tab(s), PO, Daily, 30 tab(s) ; Ordering Provider:   Quinton France MD; Catalog Code:   aspirin ; Order Dt/Tm:    2/21/2012 4:14:44 PM CST          atenolol  :   atenolol ; Status:   Prescribed ; Ordered As Mnemonic:   atenolol 50 mg oral tablet ; Simple Display Line:   1 tab(s), Oral, daily, 90 tab(s), 1 Refill(s) ; Ordering Provider:   Quinton France MD; Catalog Code:   atenolol ; Order Dt/Tm:   9/15/2020 2:12:34 PM CDT          metFORMIN  :   metFORMIN ; Status:   Prescribed ; Ordered As Mnemonic:   metFORMIN 500 mg oral tablet ; Simple Display Line:   2 tab(s), Oral, bid, 360 tab(s), 1 Refill(s) ; Ordering Provider:   Quinton France MD; Catalog Code:   metFORMIN ; Order Dt/Tm:   9/15/2020 2:12:20 PM CDT          Miscellaneous Rx Supply  :   Miscellaneous Rx Supply ; Status:   Prescribed ; Ordered As Mnemonic:   cpap supplies ; Simple Display Line:   See Instructions, chamber, hoses and filters, cushions, nasal supllies, 1 box(es), 11 Refill(s) ; Ordering Provider:   Quinton France MD; Catalog Code:   Miscellaneous Rx Supply ; Order Dt/Tm:   5/1/2018 12:12:12 PM CDT ; Comment:   please supply pt with Cpap supplies x 1 year            ID Risk Screen   Recent Travel History :   No recent travel   Family Member Travel History :   No recent travel   Other Exposure to Infectious Disease :   Unknown   COVID-19 Testing Status :   No COVID-19 test performed   Elizabeth Ferrell CMA - 3/8/2021 3:44 PM CST   Social History   Social History   (As Of: 3/8/2021 3:47:22 PM CST)   Alcohol:  Current      Current   (Last Updated: 7/28/2014 3:44:32 PM CDT by Dia Wilson CMA)          Tobacco:  Denies Tobacco Use      Never (less than 100 in lifetime)   (Last Updated: 3/8/2021 3:46:08 PM CST by Elizabeth Ferrell CMA)          Electronic Cigarette/Vaping:        Electronic Cigarette Use: Never.   (Last Updated: 3/8/2021 3:46:12 PM CST by Elizabeth Ferrell CMA)          Exercise:  Occasional exercise      (Last Updated: 6/3/2010 2:53:32 PM CDT by Darcy Berrios LPN

## 2022-02-15 NOTE — NURSING NOTE
Diabetes Eye Testing Entered On:  7/8/2021 8:21 AM CDT    Performed On:  7/1/2023 8:20 AM CDT by Edith Spencer               Diabetes Eye Testing   Retinopathy Present TR :   No   Dilated Retinal Exam Date TR :   7/1/2021 CDT   Edith Spencer - 7/8/2021 8:20 AM CDT

## 2022-02-15 NOTE — LETTER
(Inserted Image. Unable to display)   June 28, 2021    NATE AMARAL  [address] [place] [address]            Dear NATE,      Thank you for selecting [facility] for your healthcare needs.    Our records indicate you are due for the following services:    Diabetic Exam ~ Please bring your glucose meter and/or your blood glucose diary to your appointment.    Non-Fasting Labs.    If you had your labs done at another facility or with Direct Access Lab Testing at [facility], please bring in a copy of the results to your next visit, mail a copy, or drop off a copy of your results to your Healthcare Provider.    (FYI   Regarding office visits: In some instances, a video visit or telephone visit may be offered as an option.)    You are due for lab work and an office visit; please schedule the lab appointment 1 week before the office visit.  This will assure all results are available to discuss with your Healthcare Provider during your visit.    **It is very helpful if you bring your medication bottles to your appointment.  This assures we have all of your current medications, including strength and dosing information, documented accurately in your medical record.    To schedule an appointment or if you have further questions, please contact your clinic at [phone].      Powered by [employer]    Sincerely,    Cedrick Burris MD

## 2022-02-15 NOTE — TELEPHONE ENCOUNTER
---------------------  From: Quinton France MD   To: NATE AMARAL    Sent: 3/5/2019 10:58:30 AM CST      All results are within acceptable limits. No treatment changes are recommended at this time.    Results:  Date Result Name Ind Value Ref Range   3/4/2019 2:51 PM Sodium Level  138 mmol/L (135 - 146)   3/4/2019 2:51 PM Potassium Level  3.9 mmol/L (3.5 - 5.3)   3/4/2019 2:51 PM Chloride Level  103 mmol/L (98 - 110)   3/4/2019 2:51 PM CO2 Level  26 mmol/L (20 - 32)   3/4/2019 2:51 PM Glucose Level  94 mg/dL (65 - 99)   3/4/2019 2:51 PM BUN  16 mg/dL (7 - 25)   3/4/2019 2:51 PM Creatinine Level  0.77 mg/dL (0.70 - 1.33)   3/4/2019 2:51 PM BUN/Creat Ratio  NOT APPLICABLE (6 - 22)   3/4/2019 2:51 PM eGFR  99 mL/min/1.73m2 (> OR = 60 - )   3/4/2019 2:51 PM eGFR African American  115 mL/min/1.73m2 (> OR = 60 - )   3/4/2019 2:51 PM Calcium Level  9.4 mg/dL (8.6 - 10.3)   3/4/2019 2:51 PM Bilirubin Total  0.8 mg/dL (0.2 - 1.2)   3/4/2019 2:51 PM Alkaline Phosphatase  76 unit/L (40 - 115)   3/4/2019 2:51 PM AST/SGOT ((H)) 40 unit/L (10 - 35)   3/4/2019 2:51 PM ALT/SGPT  38 unit/L (9 - 46)   3/4/2019 2:51 PM Protein Total  7.1 gm/dL (6.1 - 8.1)   3/4/2019 2:51 PM Albumin Level  4.4 gm/dL (3.6 - 5.1)   3/4/2019 2:51 PM Globulin  2.7 (1.9 - 3.7)   3/4/2019 2:51 PM A/G Ratio  1.6 (1.0 - 2.5)   3/4/2019 2:51 PM Hgb A1c ((H)) 6.4 ( - <5.7)   3/4/2019 2:51 PM Cholesterol  179 mg/dL ( - <200)   3/4/2019 2:51 PM Non-HDL Cholesterol ((H)) 133 ( - <130)   3/4/2019 2:51 PM HDL  46 mg/dL (>40 - )   3/4/2019 2:51 PM Cholesterol/HDL Ratio  3.9 ( - <5.0)   3/4/2019 2:51 PM LDL ((H)) 116    3/4/2019 2:51 PM Triglyceride  74 mg/dL ( - <150)   3/4/2019 2:51 PM PSA  0.6 ng/mL ( - < OR = 4.0)   3/4/2019 2:51 PM U Microalbumin  <0.2 mg/dL (See Note: - )   3/4/2019 2:51 PM Ur Creatinine  20 mg/dL (20 - 320)   3/4/2019 2:51 PM Ur Microalbumin/Creatinine Ratio  NOTE ( - <30)   3/4/2019 2:51 PM WBC  7.0 (3.8 - 10.8)   3/4/2019 2:51 PM RBC  4.87  (4.20 - 5.80)   3/4/2019 2:51 PM Hgb  14.4 gm/dL (13.2 - 17.1)   3/4/2019 2:51 PM Hct  43.1 % (38.5 - 50.0)   3/4/2019 2:51 PM MCV  88.5 fL (80.0 - 100.0)   3/4/2019 2:51 PM MCH  29.6 pg (27.0 - 33.0)   3/4/2019 2:51 PM MCHC  33.4 gm/dL (32.0 - 36.0)   3/4/2019 2:51 PM RDW  12.8 % (11.0 - 15.0)   3/4/2019 2:51 PM Platelet  221 (140 - 400)   3/4/2019 2:51 PM MPV  9.6 fL (7.5 - 12.5)

## 2022-02-15 NOTE — CARE COORDINATION
Pt appears on  [facility] chronic disease panel as out of parameters for out of date a1c.  Labs were completed in Feb and RTC were placed for appropriate f/u.  Kierra Melendez CMA

## 2022-02-15 NOTE — TELEPHONE ENCOUNTER
---------------------  From: Quinton France MD   To: NATE AMARAL    Sent: 3/10/2020 12:22:22 PM CDT  Subject: Patient Message - Results Notification          All results are within acceptable limits. No treatment changes are recommended at this time.    Results:  Date Result Name Ind Value Ref Range   3/9/2020 1:32 PM Sodium Level  138 mmol/L (135 - 146)   3/9/2020 1:32 PM Potassium Level  4.5 mmol/L (3.5 - 5.3)   3/9/2020 1:32 PM Chloride Level  103 mmol/L (98 - 110)   3/9/2020 1:32 PM CO2 Level  27 mmol/L (20 - 32)   3/9/2020 1:32 PM Glucose Level ((H)) 102 mg/dL (65 - 99)   3/9/2020 1:32 PM BUN  16 mg/dL (7 - 25)   3/9/2020 1:32 PM Creatinine Level  0.72 mg/dL (0.70 - 1.25)   3/9/2020 1:32 PM BUN/Creat Ratio  NOT APPLICABLE (6 - 22)   3/9/2020 1:32 PM eGFR  101 mL/min/1.73m2 (> OR = 60 - )   3/9/2020 1:32 PM eGFR African American  118 mL/min/1.73m2 (> OR = 60 - )   3/9/2020 1:32 PM Calcium Level  9.5 mg/dL (8.6 - 10.3)   3/9/2020 1:32 PM Bilirubin Total  0.7 mg/dL (0.2 - 1.2)   3/9/2020 1:32 PM Alkaline Phosphatase  85 unit/L (35 - 144)   3/9/2020 1:32 PM AST/SGOT  29 unit/L (10 - 35)   3/9/2020 1:32 PM ALT/SGPT  34 unit/L (9 - 46)   3/9/2020 1:32 PM Protein Total  7.1 gm/dL (6.1 - 8.1)   3/9/2020 1:32 PM Albumin Level  4.2 gm/dL (3.6 - 5.1)   3/9/2020 1:32 PM Globulin  2.9 (1.9 - 3.7)   3/9/2020 1:32 PM A/G Ratio  1.4 (1.0 - 2.5)   3/9/2020 1:32 PM Hgb A1c ((H)) 6.2 ( - <5.7)   3/9/2020 1:32 PM Cholesterol  146 mg/dL ( - <200)   3/9/2020 1:32 PM Non-HDL Cholesterol  107 ( - <130)   3/9/2020 1:32 PM HDL ((L)) 39 mg/dL (> OR = 40 - )   3/9/2020 1:32 PM Cholesterol/HDL Ratio  3.7 ( - <5.0)   3/9/2020 1:32 PM LDL  87    3/9/2020 1:32 PM Triglyceride  103 mg/dL ( - <150)   3/9/2020 1:32 PM PSA  0.9 ng/mL ( - < OR = 4.0)   3/9/2020 1:32 PM U Microalbumin  0.2 mg/dL (See Note: - )   3/9/2020 1:32 PM Microalbumin Comment  See comment    3/9/2020 1:32 PM WBC  8.7 (3.8 - 10.8)   3/9/2020 1:32 PM RBC  4.98 (4.20 -  5.80)   3/9/2020 1:32 PM Hgb  15.3 gm/dL (13.2 - 17.1)   3/9/2020 1:32 PM Hct  43.4 % (38.5 - 50.0)   3/9/2020 1:32 PM MCV  87.1 fL (80.0 - 100.0)   3/9/2020 1:32 PM MCH  30.7 pg (27.0 - 33.0)   3/9/2020 1:32 PM MCHC  35.3 gm/dL (32.0 - 36.0)   3/9/2020 1:32 PM RDW  12.8 % (11.0 - 15.0)   3/9/2020 1:32 PM Platelet  214 (140 - 400)   3/9/2020 1:32 PM MPV  10.1 fL (7.5 - 12.5)

## 2022-02-15 NOTE — LETTER
(Inserted Image. Unable to display)     February 16, 2021      NATE AMARAL  [address]  [place], [place] [id number]          Dear NATE,      Thank you for selecting [facility] (previously [facility]) for your healthcare needs.    Our records indicate you are due for the following services:    Diabetic Exam ~ Please bring your glucose meter and/or your blood glucose diary to your appointment.  Hypertension check ~ please remember to bring your at-home blood pressure readings with you to your appointment.   Fasting Lab Tests ~ Please do not eat or drink anything 10 hours prior to your scheduled appointment time.  (Water and any medications that you may need are allowed unless directed otherwise.)    If you had your labs done at another facility or with Direct Access Lab Testing at [facility], please bring in a copy of the results to your next visit, mail a copy, or drop off a copy of your results to your Healthcare Provider.    (FYI   Regarding office visits: In some instances, a video visit or telephone visit may be offered as an option.)    You are due for lab work and an office visit; please schedule the lab appointment 1 week before the office visit.  This will assure all results are available to discuss with your Healthcare Provider during your visit.    **It is very helpful if you bring your medication bottles to your appointment.  This assures we have all of your current medications, including strength and dosing information, documented accurately in your medical record.    To schedule an appointment or if you have further questions, please contact your clinic at [phone].      Powered by [employer]    Sincerely,    Quinton France MD

## 2022-02-15 NOTE — PROGRESS NOTES
Patient:   NATE AMARAL            MRN: [id number]            FIN: [id number]               Age:   59 years     Sex:  Male     :  1960   Associated Diagnoses:   HTN (Hypertension); Diabetes mellitus type 2   Author:   Quinton France MD      Impression and Plan   Diagnosis     HTN (Hypertension) ([id number] I10).     Course:  Well controlled.    Orders     Orders   Charges (Evaluation and Management):  [id number] office outpatient visit 15 minutes (Charge) (Order): Quantity: 1, HTN (Hypertension)  Diabetes mellitus type 2.     Orders (Selected)   Prescriptions  Prescribed  atenolol 50 mg oral tablet: = 1 tab(s) ( 50 mg ), Oral, daily, # 90 tab(s), 1 Refill(s), Type: Maintenance, Pharmacy: [facility], 1 tab(s) Oral daily.     Diagnosis     Diabetes mellitus type 2 (MIQ79-QF E11.9).     Course:  Well controlled.    Orders     Orders (Selected)   Prescriptions  Prescribed  metFORMIN 500 mg oral tablet: = 2 tab(s) ( 1,000 mg ), po, bid, # 360 tab(s), 1 Refill(s), Type: Maintenance, Pharmacy: [facility], Pt due for appt, 2 tab(s) Oral bid.        Visit Information      Date of Service: 2019 02:20 pm  Performing Location: [facility]  Encounter#: [id number]      Primary Care Provider (PCP):  Quinton France MD    NPI# [id number]      Referring Provider:  Quinton France MD, NPI# [id number]   Visit type:  Scheduled follow-up.    Accompanied by:  No one.    Source of history:  Self.    Referral source:  Self.    History limitation:  None.       Chief Complaint   2019 2:25 PM CDT    Pt here for med ck        History of Present Illness             The patient presents for follow-up evaluation of diabetes.  The quality of the patient's diabetes is described as being unchanged from previous visit.  Relieving factors consist of diet changes, increased activity and medication.  Associated symptoms consist of none.  Medical encounters: none.  Compliance problems: none.  Glucose results:  within target range.  Hemoglobin A1c results: > 6% and within target range.  Lifestyle modification: weight reduction, diet, increased physical activity.  Medications: see medication list .  Additional pertinent history: last eye exam: 6/20/2019 and last podiatric foot exam: 9/12/2019.  No reported episodes of hypoglycemia.               The patient presents for follow-up evaluation of hypertension.  The quality of hypertension symptom(s) since the patient's last visit is described as being unchanged.  The severity of the hypertension symptom(s) since the last visit is moderate.  Since the patient's last visit, the timing/course of hypertension symptom(s) is constant.  Exacerbating factors consist of none.  Relieving factors consist of medication.  Associated symptoms consist of none.  Prior treatment consists of lifestyle modification (weight reduction, dietary sodium restriction, increased physical activity, adoption of DASH eating plan).  Medical encounters: none.  Compliance problems: none.        Review of Systems   Constitutional:  Negative.    Eye:  Negative.    Ear/Nose/Mouth/Throat:  Negative.    Respiratory:  Negative.    Cardiovascular:  Negative.    Gastrointestinal:  Negative.    Genitourinary:  Negative.    Hematology/Lymphatics:  Negative.    Endocrine:  Negative.    Immunologic:  Negative.    Musculoskeletal:  Negative.    Integumentary:  Negative.    Neurologic:  Negative.    Psychiatric:  Negative.    All other systems reviewed and negative      Health Status   Allergies:    Allergic Reactions (Selected)  Severity Not Documented  Simvastatin (Myalgia)  Statins (No reactions were documented)   Medications:  (Selected)   Prescriptions  Prescribed  aspirin 81 mg oral enteric coated tablet: 1 tab(s) ( 81 mg ), PO, Daily, # 30 tab(s), 0 Refill(s), Type: Maintenance, OTC (Rx)  atenolol 50 mg oral tablet: = 1 tab(s) ( 50 mg ), Oral, daily, # 90 tab(s), 1 Refill(s), Type: Maintenance, Pharmacy: [facility], 1 tab(s) Oral daily  cpap supplies: cpap supplies, See Instructions, Instructions: chamber, hoses and filters, cushions, nasal supllies, Supply, # 1 box(es), 11 Refill(s), Type: Maintenance  metFORMIN 500 mg oral tablet: = 2 tab(s) ( 1,000 mg ), po, bid, # 360 tab(s), 1 Refill(s), Type: Maintenance, Pharmacy: [facility], Pt due for appt, 2 tab(s) Oral bid   Problem list:    All Problems  Diabetes / SNOMED CT 540533195 / Confirmed  Diabetes mellitus type 2 / SNOMED CT 490269808 / Confirmed  Fatty Liver / ICD-9-.8 / Confirmed  HTN (Hypertension) / ICD-9-.9 / Confirmed  Hyperlipidemia / SNOMED CT 72045920 / Confirmed  Myalgia due to statin / SNOMED CT 3981110033 / Confirmed  Obesity / ICD-9-.00 / Confirmed  FRANCISCO J (Obstructive Sleep Apnea) / ICD-9-.23 / Confirmed  Canceled: Hyperlipemia / ICD-9-.4      Histories   Past Medical History:    Active  HTN (Hypertension) (401.9)  Obesity (278.00)  Fatty Liver (571.8)  Diabetes mellitus type 2 (771903273)  FRANCISCO J (Obstructive Sleep Apnea) (327.23)   Family History:    Cancer  Father ()  Diabetes mellitus type II  Mother     Procedure history:    Colonoscopy (SNOMED CT 843921697) in  at 48 Years.  Rotator cuff repair (SNOMED CT 892303783) in  at 42 Years.  Comments:  2010 2:51 PM CDT - Quinton France MD  Right shoulder  Arthroscopy (SNOMED CT 75126907) in  at 26 Years.  Comments:  6/3/2010 2:52 PM CDT - Darcy Berrios LPN  left knee  Concussion (SNOMED CT 053800481) in  at 13 Years.   Social History:        Alcohol Assessment: Current            Current      Tobacco Assessment: Denies Tobacco Use            Never      Exercise and Physical Activity Assessment: Occasional exercise        Physical Examination   Vital Signs   2019 2:25 PM CDT Peripheral Pulse Rate 60 bpm    Systolic Blood Pressure 128 mmHg    Diastolic Blood Pressure 76 mmHg    Mean Arterial Pressure 93 mmHg    Oxygen Saturation 97 %       Measurements from flowsheet : Measurements   9/12/2019 2:25 PM CDT Height Measured - Standard 68 in    Weight Measured - Standard 239 lb    BSA 2.28 m2    Body Mass Index 36.34 kg/m2  HI      General:  Alert and oriented, No acute distress.    HENT:  Normocephalic.    Neck:  Supple, No lymphadenopathy, No thyromegaly.    Respiratory:  Lungs are clear to auscultation, Respirations are non-labored, Breath sounds are equal, Symmetrical chest wall expansion.    Cardiovascular:  Normal rate, Regular rhythm, No murmur, No gallop, Good pulses equal in all extremities, Normal peripheral perfusion, No edema.    Gastrointestinal:  Soft, Non-tender, Non-distended, Normal bowel sounds, No organomegaly.    Musculoskeletal:  Normal range of motion, No swelling, No deformity, Normal gait.    Integumentary:  Warm, Dry, Pink, No foot ulcers or skin lesions..    Feet:  Normal by visual exam, Sensation intact, By monofilament exam.    Neurologic:  Alert, Oriented, Normal sensory, Normal motor function, No focal deficits.    Psychiatric:  Cooperative, Appropriate mood & affect.       Review / Management   Results review:  Lab results   9/5/2019 2:45 PM CDT Sodium Level 138 mmol/L    Potassium Level 4.0 mmol/L    Chloride Level 102 mmol/L    CO2 Level 27 mmol/L    Glucose Level 108 mg/dL  HI    BUN 19 mg/dL    Creatinine Level 0.95 mg/dL    BUN/Creat Ratio NOT APPLICABLE    eGFR 87 mL/min/1.73m2    eGFR African American 100 mL/min/1.73m2    Calcium Level 9.3 mg/dL    Bilirubin Total 1.1 mg/dL    Alkaline Phosphatase 76 unit/L    AST/SGOT 55 unit/L  HI    ALT/SGPT 44 unit/L    Protein Total 7.4 gm/dL    Albumin Level 4.3 gm/dL    Globulin 3.1    A/G Ratio 1.4    Hgb A1c 6.3  HI    Cholesterol 175 mg/dL    Non-HDL Cholesterol 130  HI    HDL 45 mg/dL    Cholesterol/HDL Ratio 3.9      HI    Triglyceride 115 mg/dL    WBC 9.1    RBC 5.08    Hgb 15.7 gm/dL    Hct 46.0 %    MCV 90.6 fL    MCH 30.9 pg    MCHC 34.1 gm/dL    RDW 12.2 %     Platelet 207    MPV 9.3 fL   .

## 2022-02-15 NOTE — NURSING NOTE
Comprehensive Intake Entered On:  9/12/2019 2:29 PM CDT    Performed On:  9/12/2019 2:25 PM CDT by Britany Betancourt CMA               Summary   Chief Complaint :   Pt here for med ck   Weight Measured :   239 lb(Converted to: 239 lb 0 oz, 108.41 kg)    Height Measured :   68 in(Converted to: 5 ft 8 in, 172.72 cm)    Body Mass Index :   36.34 kg/m2 (HI)    Body Surface Area :   2.28 m2   Systolic Blood Pressure :   128 mmHg   Diastolic Blood Pressure :   76 mmHg   Mean Arterial Pressure :   93 mmHg   Peripheral Pulse Rate :   60 bpm   Oxygen Saturation :   97 %   Britany Betancourt CMA - 9/12/2019 2:25 PM CDT   Health Status   Allergies Verified? :   Yes   Medication History Verified? :   Yes   Immunizations Current :   Yes   Medical History Verified? :   Yes   Pre-Visit Planning Status :   Completed   Tobacco Use? :   Never smoker   Britany Betancourt CMA - 9/12/2019 2:25 PM CDT   Consents   Consent for Immunization Exchange :   Consent Granted   Consent for Immunizations to Providers :   Consent Granted   Britany Betancourt CMA - 9/12/2019 2:25 PM CDT   Meds / Allergies   (As Of: 9/12/2019 2:29:57 PM CDT)   Allergies (Active)   simvastatin  Estimated Onset Date:   Unspecified ; Reactions:   Myalgia ; Created By:   Marcelle Davenport MD; Reaction Status:   Active ; Category:   Drug ; Substance:   simvastatin ; Type:   Allergy ; Updated By:   Marcelle Davenport MD; Reviewed Date:   9/12/2019 2:25 PM CDT      statins  Estimated Onset Date:   Unspecified ; Created By:   Quinton France MD; Reaction Status:   Active ; Category:   Drug ; Substance:   statins ; Type:   Allergy ; Updated By:   Quinton France MD; Reviewed Date:   9/12/2019 2:25 PM CDT        Medication List   (As Of: 9/12/2019 2:29:57 PM CDT)   Prescription/Discharge Order    aspirin  :   aspirin ; Status:   Prescribed ; Ordered As Mnemonic:   aspirin 81 mg oral enteric coated tablet ; Simple Display Line:   81 mg, 1 tab(s), PO, Daily, 30 tab(s) ; Ordering Provider:    Quinton France MD; Catalog Code:   aspirin ; Order Dt/Tm:   2/21/2012 4:14:44 PM          atenolol  :   atenolol ; Status:   Prescribed ; Ordered As Mnemonic:   atenolol 50 mg oral tablet ; Simple Display Line:   50 mg, 1 tab(s), Oral, daily, 90 tab(s), 1 Refill(s) ; Ordering Provider:   Quinton France MD; Catalog Code:   atenolol ; Order Dt/Tm:   3/11/2019 2:38:29 PM          metFORMIN  :   metFORMIN ; Status:   Prescribed ; Ordered As Mnemonic:   metFORMIN 500 mg oral tablet ; Simple Display Line:   1,000 mg, 2 tab(s), po, bid, 360 tab(s), 1 Refill(s) ; Ordering Provider:   Quinton France MD; Catalog Code:   metFORMIN ; Order Dt/Tm:   3/11/2019 2:38:23 PM          Miscellaneous Rx Supply  :   Miscellaneous Rx Supply ; Status:   Prescribed ; Ordered As Mnemonic:   cpap supplies ; Simple Display Line:   See Instructions, chamber, hoses and filters, cushions, nasal supllies, 1 box(es), 11 Refill(s) ; Ordering Provider:   Quinton France MD; Catalog Code:   Miscellaneous Rx Supply ; Order Dt/Tm:   5/1/2018 12:12:12 PM ; Comment:   please supply pt with Cpap supplies x 1 year

## 2022-02-15 NOTE — PROGRESS NOTES
Patient:   NATE AMARAL            MRN: [id number]            FIN: [id number]               Age:   56 years     Sex:  Male     :  1960   Associated Diagnoses:   HTN (Hypertension); Diabetes mellitus type 2   Author:   Quinton France MD      Impression and Plan   Diagnosis     HTN (Hypertension) ([id number] I10).     Course:  Well controlled.    Orders     Orders   Charges (Evaluation and Management):  [id number] office outpatient visit 15 minutes (Charge) (Order): Quantity: 1, HTN (Hypertension)  Diabetes mellitus type 2.     Orders (Selected)   Prescriptions  Prescribed  atenolol 50 mg oral tablet: 1 tab(s) ( 50 mg ), po, daily, # 90 tab(s), 1 Refill(s), Type: Maintenance, Pharmacy: [facility], 1 tab(s) po daily.     Diagnosis     Diabetes mellitus type 2 ([id number] E11.9).     Course:  Well controlled.    Orders     Orders (Selected)   Prescriptions  Prescribed  metFORMIN 500 mg oral tablet: 2 tab(s) ( 1,000 mg ), po, bid, Instructions: with meals, # 360 tab(s), 1 Refill(s), Type: Maintenance, Pharmacy: [facility], 2 tab(s) po bid,Instr:with meals.        Visit Information      Date of Service: 2017 02:24 pm  Performing Location: [facility]  Encounter#: [id number]      Primary Care Provider (PCP):  Quinton France MD    NPI# [id number]      Referring Provider:  No referring provider recorded for selected visit.   Visit type:  Scheduled follow-up.    Accompanied by:  No one.    Source of history:  Self.    Referral source:  Self.    History limitation:  None.       Chief Complaint   2017 2:26 PM CDT     Pt here for med ck        History of Present Illness             The patient presents for follow-up evaluation of diabetes.  The quality of the patient's diabetes is described as being unchanged from previous visit.  Relieving factors consist of diet changes, increased activity and medication.  Associated symptoms consist of none.  Medical encounters: none.  Compliance  problems: none.  Glucose results: within target range.  Hemoglobin A1c results: > 6% and within target range.  Lifestyle modification: weight reduction, diet, increased physical activity.  Medications: see medication list .  Additional pertinent history: last eye exam: 6/3/2016, last podiatric foot exam: 5/1/2017 and eye exam recommended.  No reported episodes of hypoglycemia.               The patient presents for follow-up evaluation of hypertension.  The quality of hypertension symptom(s) since the patient's last visit is described as being unchanged.  The severity of the hypertension symptom(s) since the last visit is moderate.  Since the patient's last visit, the timing/course of hypertension symptom(s) is constant.  Exacerbating factors consist of none.  Relieving factors consist of medication.  Associated symptoms consist of none.  Prior treatment consists of lifestyle modification (weight reduction, dietary sodium restriction, increased physical activity, adoption of DASH eating plan).  Medical encounters: none.  Compliance problems: none.        Review of Systems   Constitutional:  Negative.    Eye:  Negative.    Ear/Nose/Mouth/Throat:  Negative.    Respiratory:  Negative.    Cardiovascular:  Negative.    Gastrointestinal:  Negative.    Genitourinary:  Negative.    Hematology/Lymphatics:  Negative.    Endocrine:  Negative.    Immunologic:  Negative.    Musculoskeletal:  Negative.    Integumentary:  Negative.    Neurologic:  Negative.    Psychiatric:  Negative.    All other systems reviewed and negative      Health Status   Allergies:    Allergic Reactions (Selected)  Severity Not Documented  Simvastatin (Myalgia)   Medications:  (Selected)   Prescriptions  Prescribed  aspirin 81 mg oral enteric coated tablet: 1 tab(s) ( 81 mg ), PO, Daily, # 30 tab(s), 0 Refill(s), Type: Maintenance, OTC (Rx)  atenolol 50 mg oral tablet: 1 tab(s) ( 50 mg ), po, daily, # 90 tab(s), 1 Refill(s), Type: Maintenance, Pharmacy:  [facility], 1 tab(s) po daily  metFORMIN 500 mg oral tablet: 2 tab(s) ( 1,000 mg ), po, bid, Instructions: with meals, # 360 tab(s), 1 Refill(s), Type: Maintenance, Pharmacy: [facility], 2 tab(s) po bid,Instr:with meals  Documented Medications  Documented  Vitamin D3 1000 intl units oral tablet: 1 tab(s) ( 1,000 International Unit ), po, daily, Instructions: Takes 2000Units daily, 0 Refill(s), Type: Maintenance   Problem list:    All Problems  Diabetes / SNOMED CT 157677996 / Confirmed  Diabetes mellitus type 2 / SNOMED CT 911149493 / Confirmed  Fatty Liver / ICD-9-.8 / Confirmed  HTN (Hypertension) / ICD-9-.9 / Confirmed  Hyperlipidemia / SNOMED CT 15336027 / Confirmed  Obesity / ICD-9-.00 / Confirmed  FRANCISCO J (Obstructive Sleep Apnea) / ICD-9-.23 / Confirmed  Canceled: Hyperlipemia / ICD-9-.4      Histories   Past Medical History:    Active  HTN (Hypertension) (401.9)  Obesity (278.00)  Fatty Liver (571.8)  Diabetes mellitus type 2 (289825855)  FRANCISCO J (Obstructive Sleep Apnea) (327.23)   Family History:    Cancer  Father ()  Diabetes mellitus type II  Mother     Procedure history:    Colonoscopy (SNOMED CT 401889726) in  at 48 Years.  Rotator cuff repair (SNOMED CT 394680040) in  at 42 Years.  Comments:  2010 2:51 PM - Quinton France MD  Right shoulder  Arthroscopy (SNOMED CT 66349405) in  at 26 Years.  Comments:  6/3/2010 2:52 PM - Darcy Berrios LPN  left knee  Concussion (SNOMED CT 156275340) in  at 13 Years.   Social History:        Alcohol Assessment: Current            Current      Tobacco Assessment: Denies Tobacco Use            Never      Exercise and Physical Activity Assessment: Occasional exercise        Physical Examination   Vital Signs   2017 2:26 PM CDT Peripheral Pulse Rate 64 bpm    Pulse Site Radial artery    HR Method Manual    Systolic Blood Pressure 130 mmHg    Diastolic Blood Pressure 82 mmHg    Mean Arterial Pressure  98 mmHg    BP Site Left arm    BP Method Manual      Measurements from flowsheet : Measurements   5/1/2017 2:26 PM CDT Height Measured - Standard 68 in    Weight Measured - Standard 248.4 lb    BSA 2.32 m2    Body Mass Index 37.76 kg/m2      General:  Alert and oriented, No acute distress.    HENT:  Normocephalic.    Neck:  Supple, No lymphadenopathy, No thyromegaly.    Respiratory:  Lungs are clear to auscultation, Respirations are non-labored, Breath sounds are equal, Symmetrical chest wall expansion.    Cardiovascular:  Normal rate, Regular rhythm, No murmur, No gallop, Good pulses equal in all extremities, Normal peripheral perfusion, No edema.    Gastrointestinal:  Soft, Non-tender, Non-distended, Normal bowel sounds, No organomegaly.    Musculoskeletal:  Normal range of motion, No swelling, No deformity, Normal gait.    Integumentary:  Warm, Dry, Pink, No foot ulcers or skin lesions..    Feet:  Normal by visual exam, Sensation intact, By monofilament exam.    Neurologic:  Alert, Oriented, Normal sensory, Normal motor function, No focal deficits.    Psychiatric:  Cooperative, Appropriate mood & affect.       Review / Management   Results review:  Lab results   4/27/2017 2:40 PM CDT Sodium Level 139 mmol/L    Potassium Level 4.0 mmol/L    Chloride Level 104 mmol/L    CO2 Level 23 mmol/L    Glucose Level 107 mg/dL  HI    BUN 22 mg/dL    Creatinine Level 0.77 mg/dL    BUN/Creat Ratio NOT APPLICABLE    eGFR 101 mL/min/1.73m2    eGFR African American 117 mL/min/1.73m2    Calcium Level 9.4 mg/dL    Bilirubin Total 1.0 mg/dL    Alkaline Phosphatase 74 unit/L    AST/SGOT 31 unit/L    ALT/SGPT 30 unit/L    Protein Total 7.3 gm/dL    Albumin Level 4.5 gm/dL    Globulin 2.8    A/G Ratio 1.6    Hgb A1c 6.2  HI    Cholesterol 162 mg/dL    Non-HDL Cholesterol 121    HDL 41 mg/dL    Cholesterol/HDL Ratio 4.0        Triglyceride 80 mg/dL    U Microalbumin 0.8 mg/dL    Ur Creatinine 145 mg/dL    Ur  Microalbumin/Creatinine Ratio 6    WBC 8.7    RBC 5.15    Hgb 15.6 gm/dL    Hct 45.4 %    MCV 88.1 fL    MCH 30.3 pg    MCHC 34.4 gm/dL    RDW 13.1 %    Platelet 201    MPV 8.3 fL   .

## 2022-09-15 ENCOUNTER — TRANSFERRED RECORDS (OUTPATIENT)
Dept: HEALTH INFORMATION MANAGEMENT | Facility: CLINIC | Age: 62
End: 2022-09-15

## 2022-09-15 LAB — RETINOPATHY: NEGATIVE

## 2023-09-22 ENCOUNTER — TRANSFERRED RECORDS (OUTPATIENT)
Dept: HEALTH INFORMATION MANAGEMENT | Facility: CLINIC | Age: 63
End: 2023-09-22

## 2023-09-22 LAB — RETINOPATHY: NEGATIVE

## 2025-06-27 ENCOUNTER — TRANSFERRED RECORDS (OUTPATIENT)
Dept: HEALTH INFORMATION MANAGEMENT | Facility: CLINIC | Age: 65
End: 2025-06-27

## 2025-06-27 LAB — RETINOPATHY: NORMAL
